# Patient Record
Sex: FEMALE | Race: WHITE | NOT HISPANIC OR LATINO | URBAN - METROPOLITAN AREA
[De-identification: names, ages, dates, MRNs, and addresses within clinical notes are randomized per-mention and may not be internally consistent; named-entity substitution may affect disease eponyms.]

---

## 2017-01-11 ENCOUNTER — HISTORICAL (OUTPATIENT)
Dept: RADIOLOGY | Facility: HOSPITAL | Age: 72
End: 2017-01-11

## 2018-01-12 ENCOUNTER — HISTORICAL (OUTPATIENT)
Dept: RADIOLOGY | Facility: HOSPITAL | Age: 73
End: 2018-01-12

## 2019-01-17 ENCOUNTER — HISTORICAL (OUTPATIENT)
Dept: RADIOLOGY | Facility: HOSPITAL | Age: 74
End: 2019-01-17

## 2020-01-20 ENCOUNTER — HISTORICAL (OUTPATIENT)
Dept: RADIOLOGY | Facility: HOSPITAL | Age: 75
End: 2020-01-20

## 2021-02-19 ENCOUNTER — HISTORICAL (OUTPATIENT)
Dept: RADIOLOGY | Facility: HOSPITAL | Age: 76
End: 2021-02-19

## 2022-02-01 NOTE — PROGRESS NOTES
Clinic Note    Reason for visit:  The primary encounter diagnosis was History of colon cancer. Diagnoses of History of colon polyps, Rectal bleeding, and BMI 34.0-34.9,adult were also pertinent to this visit.    PCP: Robert Mcgowan   [address] / [place] [address]    HPI:  This is a 76 y.o. female who is being seen for a follow-up. History of colon cancer stage I in 2005, s/p right hemicolectomy. Reports normal BMs, 2-3 BMs/day, occasional BRBPR which she attributes to hemorrhoids. Weight is stable per patient.     Last colonoscopy report reviewed from [facility]: 7/2021, 1 polyp, colon Bx normal.   Doing well overall. Takes fiber daily.    Review of Systems   Constitutional: Negative for chills, diaphoresis, fatigue, fever and unexpected weight change.   HENT: Negative for mouth sores, nosebleeds, postnasal drip, sore throat, trouble swallowing and voice change.    Eyes: Negative for pain, discharge and eye dryness.   Respiratory: Negative for apnea, cough, choking, chest tightness, shortness of breath and wheezing.    Cardiovascular: Negative for chest pain, palpitations, leg swelling and claudication.   Gastrointestinal: Negative for abdominal distention, abdominal pain, anal bleeding, blood in stool, change in bowel habit, constipation, diarrhea, nausea, rectal pain, vomiting, reflux, fecal incontinence and change in bowel habit.   Genitourinary: Negative for bladder incontinence, difficulty urinating, dysuria, flank pain, frequency and hematuria.   Musculoskeletal: Negative for arthralgias, back pain, joint swelling and joint deformity.   Integumentary:  Negative for color change, rash and wound.   Allergic/Immunologic: Negative for environmental allergies and food allergies.   Neurological: Negative for seizures, facial asymmetry, speech difficulty, weakness, headaches and memory loss.   Hematological: Negative for adenopathy. Does not bruise/bleed easily.   Psychiatric/Behavioral: Negative  "for agitation, behavioral problems, confusion, hallucinations and sleep disturbance.      Past Medical History:   Diagnosis Date    Chronic back pain     Colon cancer 2005    s/p resection    High cholesterol     Hypertension      Past Surgical History:   Procedure Laterality Date    AORTIC VALVULOPLASTY      APPENDECTOMY      CHOLECYSTOTOMY      RIGHT HEMICOLECTOMY  2005    TOTAL ABDOMINAL HYSTERECTOMY      TOTAL REPLACEMENT OF BOTH KNEES USING COMPUTER-ASSISTED NAVIGATION       History reviewed. No pertinent family history.  Social History     Tobacco Use    Smoking status: Former Smoker     Quit date:      Years since quittin.0    Smokeless tobacco: Never Used   Substance Use Topics    Alcohol use: Not Currently    Drug use: Not Currently     Review of patient's allergies indicates:   Allergen Reactions    Vicodin [hydrocodone-acetaminophen] Hives        Vital Signs:  BP (!) 178/71   Pulse 80   Ht 5' 3" (1.6 m)   Wt 87.5 kg (193 lb)   SpO2 (!) 93%   BMI 34.19 kg/m²   Body mass index is 34.19 kg/m².      Physical Exam  Vitals reviewed.   Constitutional:       General: She is awake. She is not in acute distress.     Appearance: Normal appearance. She is well-developed. She is obese. She is not ill-appearing, toxic-appearing or diaphoretic.   HENT:      Head: Normocephalic and atraumatic.      Ears:      Comments: Moderately hard of hearing     Nose: Nose normal.      Mouth/Throat:      Mouth: Mucous membranes are moist.      Pharynx: Oropharynx is clear. No oropharyngeal exudate or posterior oropharyngeal erythema.   Eyes:      General: Lids are normal. Gaze aligned appropriately. No scleral icterus.        Right eye: No discharge.         Left eye: No discharge.      Extraocular Movements: Extraocular movements intact.      Conjunctiva/sclera: Conjunctivae normal.   Neck:      Trachea: Trachea normal.   Cardiovascular:      Rate and Rhythm: Normal rate and regular rhythm.      Pulses:  "          Radial pulses are 2+ on the right side and 2+ on the left side.   Pulmonary:      Effort: Pulmonary effort is normal. No respiratory distress.      Breath sounds: Normal breath sounds. No stridor. No wheezing or rhonchi.   Chest:      Chest wall: No tenderness.   Abdominal:      General: Abdomen is flat. Bowel sounds are normal. There is no distension.      Palpations: Abdomen is soft. There is no fluid wave, hepatomegaly or mass.      Tenderness: There is no abdominal tenderness. There is no guarding or rebound.   Musculoskeletal:         General: No tenderness or deformity.      Cervical back: Full passive range of motion without pain and neck supple. No tenderness.      Right lower leg: No edema.      Left lower leg: No edema.   Lymphadenopathy:      Cervical: No cervical adenopathy.   Skin:     General: Skin is warm and dry.      Capillary Refill: Capillary refill takes less than 2 seconds.      Coloration: Skin is not cyanotic, jaundiced or pale.      Findings: No rash.   Neurological:      General: No focal deficit present.      Mental Status: She is alert and oriented to person, place, and time.      Cranial Nerves: No facial asymmetry.      Motor: No tremor.   Psychiatric:         Attention and Perception: Attention normal.         Mood and Affect: Mood and affect normal.         Speech: Speech normal.         Behavior: Behavior normal. Behavior is cooperative.          Labs: Pertinent labs reviewed.       Assessment:  History of colon cancer    History of colon polyps    Rectal bleeding    BMI 34.0-34.9,adult    She will contact us if any issues in the meantime.  Including if her blood per rectum changes or if she develops any abdominal pain.  Will plan for repeat colonoscopy in 2023 otherwise.     Recommendations:  She will contact us if any issues in the meantime.  Including if her blood per rectum changes or if she develops any abdominal pain.    Patient screened for and received weight  management and nutritional counseling regarding BMI of less than 18 or greater than 25.    Follow up in about 6 months (around 8/2/2022).    Order summary:  No orders of the defined types were placed in this encounter.         Ebonie Ellsworth MD

## 2022-02-02 ENCOUNTER — OFFICE VISIT (OUTPATIENT)
Dept: GASTROENTEROLOGY | Facility: CLINIC | Age: 77
End: 2022-02-02
Payer: MEDICARE

## 2022-02-02 VITALS
DIASTOLIC BLOOD PRESSURE: 71 MMHG | HEIGHT: 63 IN | WEIGHT: 193 LBS | SYSTOLIC BLOOD PRESSURE: 178 MMHG | BODY MASS INDEX: 34.2 KG/M2 | HEART RATE: 80 BPM | OXYGEN SATURATION: 93 %

## 2022-02-02 DIAGNOSIS — Z85.038 HISTORY OF COLON CANCER: Primary | ICD-10-CM

## 2022-02-02 DIAGNOSIS — Z86.010 HISTORY OF COLON POLYPS: ICD-10-CM

## 2022-02-02 DIAGNOSIS — K62.5 RECTAL BLEEDING: ICD-10-CM

## 2022-02-02 PROCEDURE — 99203 OFFICE O/P NEW LOW 30 MIN: CPT | Mod: S$GLB,,, | Performed by: INTERNAL MEDICINE

## 2022-02-02 PROCEDURE — 99203 PR OFFICE/OUTPT VISIT, NEW, LEVL III, 30-44 MIN: ICD-10-PCS | Mod: S$GLB,,, | Performed by: INTERNAL MEDICINE

## 2022-02-02 RX ORDER — GABAPENTIN 100 MG/1
100 CAPSULE ORAL 3 TIMES DAILY
COMMUNITY
Start: 2022-01-03

## 2022-02-02 RX ORDER — METOPROLOL TARTRATE 25 MG/1
TABLET, FILM COATED ORAL
COMMUNITY
Start: 2021-12-23

## 2022-02-02 RX ORDER — SIMVASTATIN 20 MG/1
20 TABLET, FILM COATED ORAL NIGHTLY
COMMUNITY
Start: 2021-12-11 | End: 2022-11-21 | Stop reason: DRUGHIGH

## 2022-02-02 NOTE — PATIENT INSTRUCTIONS
She will contact us if any issues in the meantime.  Including if her blood per rectum changes or if she develops any abdominal pain.

## 2022-02-07 DIAGNOSIS — Z01.419 ROUTINE GYNECOLOGICAL EXAMINATION: Primary | ICD-10-CM

## 2022-02-15 ENCOUNTER — OFFICE VISIT (OUTPATIENT)
Dept: OBSTETRICS AND GYNECOLOGY | Facility: CLINIC | Age: 77
End: 2022-02-15
Payer: MEDICARE

## 2022-02-15 VITALS
HEIGHT: 63 IN | DIASTOLIC BLOOD PRESSURE: 83 MMHG | WEIGHT: 194.13 LBS | SYSTOLIC BLOOD PRESSURE: 148 MMHG | HEART RATE: 73 BPM | BODY MASS INDEX: 34.4 KG/M2

## 2022-02-15 DIAGNOSIS — Z01.419 ROUTINE GYNECOLOGICAL EXAMINATION: Primary | ICD-10-CM

## 2022-02-15 DIAGNOSIS — N95.2 ATROPHIC VAGINITIS: ICD-10-CM

## 2022-02-15 PROCEDURE — G0101 PR CA SCREEN;PELVIC/BREAST EXAM: ICD-10-PCS | Mod: S$GLB,,, | Performed by: NURSE PRACTITIONER

## 2022-02-15 PROCEDURE — G0101 CA SCREEN;PELVIC/BREAST EXAM: HCPCS | Mod: S$GLB,,, | Performed by: NURSE PRACTITIONER

## 2022-02-15 RX ORDER — DIAZEPAM 5 MG/1
TABLET ORAL
COMMUNITY
Start: 2022-01-28

## 2022-02-15 RX ORDER — ESTRADIOL 0.1 MG/G
CREAM VAGINAL DAILY
COMMUNITY
End: 2022-08-09 | Stop reason: SDUPTHER

## 2022-02-15 NOTE — PROGRESS NOTES
"  Subjective:       Patient ID: Lisbet Carranza is a 77 y.o. female.    Chief Complaint:  Well Woman and Establish Care      History of Present Illness  Pt here for gyn annual.  History and past labs reviewed with patient.    Complaints: WWE, HX of vaginal burning and itching.      Review of Systems  Review of Systems   Constitutional: Negative for appetite change and fatigue.   HENT: Negative for tinnitus.    Cardiovascular: Negative for chest pain and palpitations.   Gastrointestinal: Negative for abdominal pain, bloating, constipation, vomiting and reflux.   Endocrine: Negative for diabetes, hyperthyroidism and hypothyroidism.   Genitourinary: Positive for vaginal dryness. Negative for flank pain, frequency, pelvic pain, urgency, vaginal bleeding and urinary incontinence.   Musculoskeletal: Negative for back pain and myalgias.   Integumentary:  Negative for rash, mole/lesion, breast mass and breast tenderness.   Neurological: Negative for vertigo, syncope and headaches.   Psychiatric/Behavioral: Negative for depression and sleep disturbance. The patient is not nervous/anxious.    Breast: Negative for lump, mass, mastodynia and tenderness          Objective:     Vitals:    02/15/22 1105   BP: (!) 148/83   Pulse: 73   Weight: 88.1 kg (194 lb 2 oz)   Height: 5' 3" (1.6 m)        Physical Exam:   Constitutional: She is oriented to person, place, and time. She appears well-developed and well-nourished. No distress.    HENT:   Head: Normocephalic and atraumatic.    Eyes: Conjunctivae and EOM are normal.    Neck: No tracheal deviation present. No thyromegaly present.    Cardiovascular: Normal rate and regular rhythm.  Exam reveals no clubbing, no cyanosis and no edema.     Pulmonary/Chest: Effort normal. No respiratory distress.        Abdominal: Soft. She exhibits no distension and no mass. There is no abdominal tenderness. There is no rebound and no guarding. No hernia.     Genitourinary:    Vagina and rectum normal. "      Pelvic exam was performed with patient supine.   There is no rash, tenderness, lesion or injury on the right labia. There is no rash, tenderness, lesion or injury on the left labia. Right adnexum displays no mass, no tenderness and no fullness. Left adnexum displays no mass, no tenderness and no fullness. Vaginal cuff normal.  No  no vaginal discharge in the vagina. Cervix is absent.Uterus is absent.    Genitourinary Comments: Atrophic vaginal changes. The area that she was concerned about is old scar tissue and nothing to be concerned about.              Musculoskeletal: Normal range of motion.       Neurological: She is alert and oriented to person, place, and time.    Skin: Skin is warm and dry. She is not diaphoretic. No cyanosis. Nails show no clubbing.    Psychiatric: She has a normal mood and affect. Her behavior is normal. Judgment and thought content normal.      breast exam wnl- no nipple dc, skin changes, masses or lymph nodes palpated bilaterally    Assessment:     1. Routine gynecological examination    2. Atrophic vaginitis              Plan:       Routine gynecological examination  -     Ambulatory referral/consult to Obstetrics / Gynecology    Atrophic vaginitis     wet prep was done and neg for yeast, BV or Tric.   Diet and exercise  Gentle soap in genital area  MMG and bone density is patsy with PCP   Follow up in about 1 year (around 2/15/2023).

## 2022-02-21 ENCOUNTER — HISTORICAL (OUTPATIENT)
Dept: ADMINISTRATIVE | Facility: HOSPITAL | Age: 77
End: 2022-02-21

## 2022-02-21 ENCOUNTER — HISTORICAL (OUTPATIENT)
Dept: RADIOLOGY | Facility: HOSPITAL | Age: 77
End: 2022-02-21

## 2022-03-08 ENCOUNTER — TELEPHONE (OUTPATIENT)
Dept: GASTROENTEROLOGY | Facility: CLINIC | Age: 77
End: 2022-03-08
Payer: MEDICARE

## 2022-03-08 NOTE — TELEPHONE ENCOUNTER
----- Message from Lizbeth Goel sent at 3/8/2022  9:17 AM CST -----  .Type:  Sooner Appointment Request    Caller is requesting a sooner appointment.  Caller declined first available appointment listed below.  Caller will not accept being placed on the waitlist and is requesting a message be sent to doctor.  Name of Caller: self  When is the first available appointment? 7/7  Symptoms: hemorrhoids  Would the patient rather a call back or a response via [facility]? call  Best Call Back Number:.[phone] (home)   Additional Information:

## 2022-03-08 NOTE — TELEPHONE ENCOUNTER
Please let the patient know that Dr. Ellsworth does not have any work in appointments available. Okay to offer next available & wait list.  KDL, CMA

## 2022-04-12 ENCOUNTER — OFFICE VISIT (OUTPATIENT)
Dept: OBSTETRICS AND GYNECOLOGY | Facility: CLINIC | Age: 77
End: 2022-04-12
Payer: MEDICARE

## 2022-04-12 VITALS
DIASTOLIC BLOOD PRESSURE: 73 MMHG | WEIGHT: 199.38 LBS | OXYGEN SATURATION: 96 % | BODY MASS INDEX: 35.33 KG/M2 | HEART RATE: 84 BPM | SYSTOLIC BLOOD PRESSURE: 175 MMHG | HEIGHT: 63 IN

## 2022-04-12 DIAGNOSIS — N95.2 ATROPHIC VAGINITIS: Primary | ICD-10-CM

## 2022-04-12 DIAGNOSIS — B37.31 YEAST VAGINITIS: ICD-10-CM

## 2022-04-12 DIAGNOSIS — K64.1 GRADE II HEMORRHOIDS: ICD-10-CM

## 2022-04-12 DIAGNOSIS — M85.852 OSTEOPENIA OF LEFT HIP: ICD-10-CM

## 2022-04-12 PROCEDURE — 99203 PR OFFICE/OUTPT VISIT, NEW, LEVL III, 30-44 MIN: ICD-10-PCS | Mod: S$GLB,,, | Performed by: NURSE PRACTITIONER

## 2022-04-12 PROCEDURE — 99203 OFFICE O/P NEW LOW 30 MIN: CPT | Mod: S$GLB,,, | Performed by: NURSE PRACTITIONER

## 2022-04-12 RX ORDER — NYSTATIN AND TRIAMCINOLONE ACETONIDE 100000; 1 [USP'U]/G; MG/G
OINTMENT TOPICAL 2 TIMES DAILY
Qty: 15 G | Refills: 2 | Status: SHIPPED | OUTPATIENT
Start: 2022-04-12 | End: 2022-08-12 | Stop reason: SDUPTHER

## 2022-04-12 RX ORDER — HYDROCORTISONE ACETATE 25 MG/1
25 SUPPOSITORY RECTAL 2 TIMES DAILY
Qty: 12 SUPPOSITORY | Refills: 1 | Status: SHIPPED | OUTPATIENT
Start: 2022-04-12 | End: 2023-03-21

## 2022-04-12 RX ORDER — CLOBETASOL PROPIONATE 0.5 MG/G
CREAM TOPICAL
COMMUNITY
Start: 2022-02-24

## 2022-04-12 NOTE — PROGRESS NOTES
"  Subjective:       Patient ID: Lisbet Carranza is a 77 y.o. female.    Chief Complaint:  Follow-up (Patient is here to talk about some test she had done )      History of Present Illness  Pt here for complaints of osteoporosis with failed use of the  Alendronate due to aching bones and joints with constipation. Inflamed hemorrhoids, vaginal atrophy with itching rash.  History and past labs reviewed with patient.        Review of Systems  Review of Systems   Gastrointestinal: Positive for fecal incontinence.   Genitourinary: Positive for vaginal pain and vaginal dryness.        Bone pain with the current alendronate   Psychiatric/Behavioral: Negative for sleep disturbance. The patient is not nervous/anxious.            Objective:     Vitals:    04/12/22 1355   BP: (!) 175/73   BP Location: Left arm   Patient Position: Sitting   Pulse: 84   SpO2: 96%   Weight: 90.4 kg (199 lb 6.4 oz)   Height: 5' 3" (1.6 m)        Physical Exam:               Genitourinary:    Genitourinary Comments: Atrophic vaginal changes with yeast rash.   Bleeding hemorrhoids  Did receive limited records from Dr. Sawyer   But will need the scans to send to the bone center                   Psychiatric: She has a normal mood and affect. Her behavior is normal. Thought content normal.          Assessment:     1. Atrophic vaginitis    2. Yeast vaginitis    3. Grade II hemorrhoids    4. Osteopenia of left hip              Plan:       Atrophic vaginitis    Yeast vaginitis  -     nystatin-triamcinolone (MYCOLOG) ointment; Apply topically 2 (two) times daily.  Dispense: 15 g; Refill: 2    Grade II hemorrhoids  -     hydrocortisone (ANUSOL-HC) 25 mg suppository; Place 1 suppository (25 mg total) rectally 2 (two) times daily.  Dispense: 12 suppository; Refill: 1    Osteopenia of left hip     Once received the records from [facility], the MD stated osteoporosis, but it is osteopenia and will not need the referral to bone center  Call to pt to increase " outside wt bearing exercise and calcium as she can tolerate. DC fosamax      Follow up if symptoms worsen or fail to improve.

## 2022-04-12 NOTE — Clinical Note
Please locate the actual record of the bone density that was done on this pt, also will need a referral to the bone center for the IV infusion for osteoporosis.  We will need the actual location of the bone density and not just his note.  Thank you in advance for your help.

## 2022-04-13 PROBLEM — M85.852 OSTEOPENIA OF LEFT HIP: Status: ACTIVE | Noted: 2022-04-13

## 2022-07-12 NOTE — PROGRESS NOTES
Clinic Note    Reason for visit:  The primary encounter diagnosis was History of colon cancer. Diagnoses of History of colon polyps, Rectal bleeding, and Obesity, Class II, BMI 35-39.9, isolated (see actual BMI) were also pertinent to this visit.    PCP: Robert Mcgowan       HPI:  This is a 77 y.o. female who is being seen for a follow-up. History of colon cancer stage I in 2005, s/p right hemicolectomy. Was having BRBPR for months and saw PCP who referred her to Dr. Merritt for colonoscopy. Had colonoscopy 5/9/2022 with Dr. Merritt who reported multiple large diverticula in descending/sigmoid colon and multiple medium-sized IH. Since the colonoscopy, she is not bleeding as much and knows it is from hemorrhoids. Reports BMs are normal for her.         Review of Systems   Constitutional: Negative for chills, diaphoresis, fatigue, fever and unexpected weight change.   HENT: Negative for mouth sores, nosebleeds, postnasal drip, sore throat, trouble swallowing and voice change.    Eyes: Positive for eye dryness. Negative for pain and discharge.   Respiratory: Negative for apnea, cough, choking, chest tightness, shortness of breath and wheezing.    Cardiovascular: Negative for chest pain, palpitations, leg swelling and claudication.   Gastrointestinal: Positive for anal bleeding. Negative for abdominal distention, abdominal pain, blood in stool, change in bowel habit, constipation, diarrhea, nausea, rectal pain, vomiting, reflux, fecal incontinence and change in bowel habit.   Genitourinary: Negative for bladder incontinence, difficulty urinating, dysuria, flank pain, frequency and hematuria.   Musculoskeletal: Negative for arthralgias, back pain, joint swelling and joint deformity.   Integumentary:  Negative for color change, rash and wound.   Allergic/Immunologic: Positive for environmental allergies. Negative for food allergies.   Neurological: Negative for seizures, facial asymmetry, speech difficulty, weakness,  "headaches and memory loss.   Hematological: Negative for adenopathy. Bruises/bleeds easily.   Psychiatric/Behavioral: Negative for agitation, behavioral problems, confusion, hallucinations and sleep disturbance.      Past Medical History:   Diagnosis Date    Chronic back pain     Colon cancer     s/p resection    Heart valve problem 2016    High cholesterol     Hypertension      Past Surgical History:   Procedure Laterality Date    AORTIC VALVULOPLASTY      APPENDECTOMY      CHOLECYSTOTOMY      RIGHT HEMICOLECTOMY  2005    TOTAL ABDOMINAL HYSTERECTOMY      TOTAL REPLACEMENT OF BOTH KNEES USING COMPUTER-ASSISTED NAVIGATION       Family History   Problem Relation Age of Onset    Lung cancer Father      Social History     Tobacco Use    Smoking status: Former Smoker     Types: Cigarettes     Quit date:      Years since quittin.5    Smokeless tobacco: Never Used   Substance Use Topics    Alcohol use: Not Currently    Drug use: Not Currently     Review of patient's allergies indicates:   Allergen Reactions    Vicodin [hydrocodone-acetaminophen] Hives        Medication List with Changes/Refills   Current Medications    CLOBETASOL (TEMOVATE) 0.05 % CREAM    APPLY ON THE SKIN TWICE A DAY    DIAZEPAM (VALIUM) 5 MG TABLET    TAKE 1 TABLET BY MOUTH ONCE DAILY AT NIGHT AS NEEDED    ESTRADIOL (ESTRACE) 0.01 % (0.1 MG/GRAM) VAGINAL CREAM    Place vaginally once daily.    GABAPENTIN (NEURONTIN) 100 MG CAPSULE    Take 100 mg by mouth 3 (three) times daily.    HYDROCORTISONE (ANUSOL-HC) 25 MG SUPPOSITORY    Place 1 suppository (25 mg total) rectally 2 (two) times daily.    METOPROLOL TARTRATE (LOPRESSOR) 25 MG TABLET    TAKE 1/2 (ONE-HALF) TABLET BY MOUTH TWICE DAILY    NYSTATIN-TRIAMCINOLONE (MYCOLOG) OINTMENT    Apply topically 2 (two) times daily.    SIMVASTATIN (ZOCOR) 20 MG TABLET    Take 20 mg by mouth nightly.         Vital Signs:  BP (!) 143/61   Pulse 64   Ht 5' 2" (1.575 m)   Wt 92.5 " kg (204 lb)   SpO2 (!) 94%   BMI 37.31 kg/m²         Physical Exam  Vitals reviewed.   Constitutional:       General: She is awake. She is not in acute distress.     Appearance: Normal appearance. She is well-developed. She is obese. She is not ill-appearing, toxic-appearing or diaphoretic.   HENT:      Head: Normocephalic and atraumatic.      Nose: Nose normal.      Mouth/Throat:      Mouth: Mucous membranes are moist.      Pharynx: Oropharynx is clear. No oropharyngeal exudate or posterior oropharyngeal erythema.   Eyes:      General: Lids are normal. Gaze aligned appropriately. No scleral icterus.        Right eye: No discharge.         Left eye: No discharge.      Extraocular Movements: Extraocular movements intact.      Conjunctiva/sclera: Conjunctivae normal.   Neck:      Trachea: Trachea normal.   Cardiovascular:      Rate and Rhythm: Normal rate and regular rhythm.      Pulses:           Radial pulses are 2+ on the right side and 2+ on the left side.   Pulmonary:      Effort: Pulmonary effort is normal. No respiratory distress.      Breath sounds: Normal breath sounds. No stridor. No wheezing or rhonchi.   Chest:      Chest wall: No tenderness.   Abdominal:      General: Bowel sounds are normal. There is no distension.      Palpations: Abdomen is soft. There is no fluid wave, hepatomegaly or mass.      Tenderness: There is no abdominal tenderness. There is no guarding or rebound.   Musculoskeletal:         General: No tenderness or deformity.      Cervical back: Full passive range of motion without pain and neck supple. No tenderness.      Right lower leg: No edema.      Left lower leg: No edema.      Comments: +kyphosis    Lymphadenopathy:      Cervical: No cervical adenopathy.   Skin:     General: Skin is warm and dry.      Capillary Refill: Capillary refill takes less than 2 seconds.      Coloration: Skin is not cyanotic, jaundiced or pale.      Findings: No rash.   Neurological:      General: No focal  deficit present.      Mental Status: She is alert and oriented to person, place, and time.      Cranial Nerves: No facial asymmetry.      Motor: No tremor.   Psychiatric:         Attention and Perception: Attention normal.         Mood and Affect: Mood and affect normal.         Speech: Speech normal.         Behavior: Behavior normal. Behavior is cooperative.            All of the data above and below has been reviewed by myself and any further interpretations will be reflected in the assessment and plan.   The data includes review of external notes, and independent interpretation of lab results, procedures, x-rays, and imaging reports.      Assessment:  History of colon cancer    History of colon polyps    Rectal bleeding    Obesity, Class II, BMI 35-39.9, isolated (see actual BMI)    Overall doing well. Will plan for repeat colonoscopy in 2023 unless having any issues.     Recommendations:  She will contact us if any issues in the meantime.    Risks, benefits, and alternatives of medical management, any associated procedures, and/or treatment discussed with the patient. Patient given opportunity to ask questions and voices understanding. Patient has elected to proceed with the recommended care modalities as discussed.    Follow up in about 1 year (around 7/13/2023).    Order summary:  No orders of the defined types were placed in this encounter.       Instructed patient to notify my office if they have not been contacted within two weeks after any procedures, submitting any samples (biopsies, blood, stool, urine, etc.) or after any imaging (X-ray, CT, MRI, etc.).     Ebonie Ellsworth MD    This document may have been created using a voice recognition transcribing system. Incorrect words or phrases may have been missed during proofreading. Please interpret accordingly or contact me for clarification.

## 2022-07-13 ENCOUNTER — OFFICE VISIT (OUTPATIENT)
Dept: GASTROENTEROLOGY | Facility: CLINIC | Age: 77
End: 2022-07-13
Payer: MEDICARE

## 2022-07-13 VITALS
DIASTOLIC BLOOD PRESSURE: 61 MMHG | HEART RATE: 64 BPM | HEIGHT: 62 IN | BODY MASS INDEX: 37.54 KG/M2 | OXYGEN SATURATION: 94 % | SYSTOLIC BLOOD PRESSURE: 143 MMHG | WEIGHT: 204 LBS

## 2022-07-13 DIAGNOSIS — K62.5 RECTAL BLEEDING: ICD-10-CM

## 2022-07-13 DIAGNOSIS — Z85.038 HISTORY OF COLON CANCER: Primary | ICD-10-CM

## 2022-07-13 DIAGNOSIS — E66.9 OBESITY, CLASS II, BMI 35-39.9, ISOLATED (SEE ACTUAL BMI): ICD-10-CM

## 2022-07-13 DIAGNOSIS — Z86.010 HISTORY OF COLON POLYPS: ICD-10-CM

## 2022-07-13 PROCEDURE — 99213 OFFICE O/P EST LOW 20 MIN: CPT | Mod: S$GLB,,, | Performed by: INTERNAL MEDICINE

## 2022-07-13 PROCEDURE — 99213 PR OFFICE/OUTPT VISIT, EST, LEVL III, 20-29 MIN: ICD-10-PCS | Mod: S$GLB,,, | Performed by: INTERNAL MEDICINE

## 2022-07-13 NOTE — PATIENT INSTRUCTIONS
Contact us if any issues in the meantime.    Please notify my office if you have not been contacted within two weeks after any procedures, submitting any samples (biopsies, blood, stool, urine, etc.) or after any imaging (X-ray, CT, MRI, etc.).

## 2022-07-13 NOTE — LETTER
July 13, 2022        Robert Mcgowan MD  [address]  [address]  [place] [address]             [facility]  [address] [address]  Phone: [phone]  Fax: [phone]   Patient: Lisbet Carranza   MR Number: [id number]   YOB: 1945   Date of Visit: 7/13/2022       Dear Dr. Mcgowan:    Thank you for referring Lisbet Carranza to me for evaluation. Attached you will find relevant portions of my assessment and plan of care.    If you have questions, please do not hesitate to call me. I look forward to following Lisbet Carranza along with you.    Sincerely,      Ebonie Ellsworth MD            CC  No Recipients    Enclosure

## 2022-08-09 ENCOUNTER — OFFICE VISIT (OUTPATIENT)
Dept: OBSTETRICS AND GYNECOLOGY | Facility: CLINIC | Age: 77
End: 2022-08-09
Payer: MEDICARE

## 2022-08-09 VITALS
BODY MASS INDEX: 37.46 KG/M2 | HEART RATE: 83 BPM | SYSTOLIC BLOOD PRESSURE: 137 MMHG | DIASTOLIC BLOOD PRESSURE: 63 MMHG | HEIGHT: 62 IN | WEIGHT: 203.56 LBS

## 2022-08-09 DIAGNOSIS — N95.2 ATROPHIC VAGINITIS: ICD-10-CM

## 2022-08-09 DIAGNOSIS — L30.9 DERMATITIS: Primary | ICD-10-CM

## 2022-08-09 DIAGNOSIS — B37.31 YEAST VAGINITIS: ICD-10-CM

## 2022-08-09 PROCEDURE — 99213 PR OFFICE/OUTPT VISIT, EST, LEVL III, 20-29 MIN: ICD-10-PCS | Mod: S$GLB,,, | Performed by: NURSE PRACTITIONER

## 2022-08-09 PROCEDURE — 99213 OFFICE O/P EST LOW 20 MIN: CPT | Mod: S$GLB,,, | Performed by: NURSE PRACTITIONER

## 2022-08-09 RX ORDER — ESTRADIOL 0.1 MG/G
1 CREAM VAGINAL
Qty: 42.5 G | Refills: 2 | Status: SHIPPED | OUTPATIENT
Start: 2022-08-11 | End: 2023-03-21 | Stop reason: SDUPTHER

## 2022-08-09 RX ORDER — NYSTATIN 100000 [USP'U]/G
POWDER TOPICAL
Qty: 60 G | Refills: 2 | Status: SHIPPED | OUTPATIENT
Start: 2022-08-09 | End: 2022-08-12 | Stop reason: SDUPTHER

## 2022-08-09 NOTE — PROGRESS NOTES
"CC: Vaginal discharge    Lisbet Carranza is a 77 y.o. female  presents with complaint of vaginal itching and excoriation on the labia and the inner thigh.     Past Medical History:   Diagnosis Date    Chronic back pain     Colon cancer     s/p resection    Heart valve problem 2016    High cholesterol     Hypertension      Past Surgical History:   Procedure Laterality Date    AORTIC VALVULOPLASTY      APPENDECTOMY      CHOLECYSTOTOMY      RIGHT HEMICOLECTOMY  2005    TOTAL ABDOMINAL HYSTERECTOMY      TOTAL REPLACEMENT OF BOTH KNEES USING COMPUTER-ASSISTED NAVIGATION       Social History     Tobacco Use    Smoking status: Former Smoker     Types: Cigarettes     Quit date:      Years since quittin.6    Smokeless tobacco: Never Used   Substance Use Topics    Alcohol use: Not Currently    Drug use: Not Currently     Family History   Problem Relation Age of Onset    Lung cancer Father      OB History    Para Term  AB Living   3 3 3     3   SAB IAB Ectopic Multiple Live Births           3      # Outcome Date GA Lbr [place]/2nd Weight Sex Delivery Anes PTL Lv   3 Term     F Vag-Spont  N FEDE   2 Term     M Vag-Spont  N FEDE   1 Term     M Vag-Spont  N FEDE         ROS:  GENERAL: No fever, chills, fatigability or weight loss.  VULVAR: Having  Itching, noted to have contact dermatitis on the labia external and the inner thigh.      VAGINAL: No relaxation, no itching, no discharge, no abnormal bleeding and no lesions.  ABDOMEN: No abdominal pain. Denies nausea. Denies vomiting. No diarrhea. No constipation  BREAST: Denies pain. No lumps. No discharge.  URINARY: No incontinence, no nocturia, no frequency and no dysuria.  CARDIOVASCULAR: No chest pain. No shortness of breath. No leg cramps.  NEUROLOGICAL: No headaches. No vision changes.      /63   Pulse 83   Ht 5' 2" (1.575 m)   Wt 92.3 kg (203 lb 9 oz)   BMI 37.23 kg/m²     PHYSICAL EXAM:  Noted to have red and " itching skin on the external genitalia,Normal  vulva, vagina, cervix, uterus and adnexa    ASSESSMENT and PLAN:  Dermatitis  -     nystatin (MYCOSTATIN) powder; Apply to affected area 3 times daily  Dispense: 60 g; Refill: 2    Atrophic vaginitis  -     estradioL (ESTRACE) 0.01 % (0.1 mg/gram) vaginal cream; Place 1 g vaginally twice a week.  Dispense: 42.5 g; Refill: 2           Patient was counseled today on vaginitis prevention including :  a. avoiding feminine products such as deoderant soaps, body wash, bubble bath, douches, scented toilet paper, deoderant tampons or pads, feminine wipes, chronic pad use, etc.  b. avoiding other vulvovaginal irritants such as long hot baths, humidity, tight, synthetic clothing, chlorine and sitting around in wet bathing suits  c. wearing cotton underwear, avoiding thong underwear and no underwear to bed  d. taking showers instead of baths and use a hair dryer on cool setting afterwards to dry  e. wearing cotton to exercise and shower immediately after exercise and change clothes  f. using polyurethane condoms without spermicide if sexually active and symptoms are triggered by intercourse    FOLLOW UP: PRN lack of improvement.

## 2022-08-12 ENCOUNTER — TELEPHONE (OUTPATIENT)
Dept: OBSTETRICS AND GYNECOLOGY | Facility: CLINIC | Age: 77
End: 2022-08-12
Payer: MEDICARE

## 2022-08-12 RX ORDER — NYSTATIN 100000 [USP'U]/G
POWDER TOPICAL
Qty: 60 G | Refills: 2 | Status: SHIPPED | OUTPATIENT
Start: 2022-08-12 | End: 2023-03-21

## 2022-08-12 RX ORDER — FLUCONAZOLE 100 MG/1
100 TABLET ORAL DAILY
Qty: 3 TABLET | Refills: 0 | Status: SHIPPED | OUTPATIENT
Start: 2022-08-12 | End: 2022-08-15

## 2022-08-12 RX ORDER — NYSTATIN 100000 [USP'U]/G
POWDER TOPICAL
Qty: 60 G | Refills: 2 | Status: SHIPPED | OUTPATIENT
Start: 2022-08-12 | End: 2023-07-18

## 2022-08-12 RX ORDER — NYSTATIN AND TRIAMCINOLONE ACETONIDE 100000; 1 [USP'U]/G; MG/G
OINTMENT TOPICAL 2 TIMES DAILY
Qty: 15 G | Refills: 2 | Status: SHIPPED | OUTPATIENT
Start: 2022-08-12 | End: 2023-07-18

## 2022-08-12 NOTE — TELEPHONE ENCOUNTER
Pt is complaining about her buttocks, so Mrs Renteria is going to send her some Med at her pharmacy. Pt is aware and voices understanding. Yolande

## 2022-11-21 ENCOUNTER — OFFICE VISIT (OUTPATIENT)
Dept: OBSTETRICS AND GYNECOLOGY | Facility: CLINIC | Age: 77
End: 2022-11-21
Payer: MEDICARE

## 2022-11-21 VITALS
WEIGHT: 204.38 LBS | DIASTOLIC BLOOD PRESSURE: 59 MMHG | HEART RATE: 64 BPM | BODY MASS INDEX: 37.38 KG/M2 | SYSTOLIC BLOOD PRESSURE: 158 MMHG

## 2022-11-21 DIAGNOSIS — N95.2 ATROPHIC VAGINITIS: Primary | ICD-10-CM

## 2022-11-21 PROCEDURE — 99213 PR OFFICE/OUTPT VISIT, EST, LEVL III, 20-29 MIN: ICD-10-PCS | Mod: S$GLB,,, | Performed by: NURSE PRACTITIONER

## 2022-11-21 PROCEDURE — 99213 OFFICE O/P EST LOW 20 MIN: CPT | Mod: S$GLB,,, | Performed by: NURSE PRACTITIONER

## 2022-11-21 RX ORDER — METOPROLOL TARTRATE 50 MG/1
TABLET ORAL
COMMUNITY
Start: 2022-11-18

## 2022-11-21 RX ORDER — SIMVASTATIN 40 MG/1
40 TABLET, FILM COATED ORAL NIGHTLY
COMMUNITY
Start: 2022-11-09

## 2022-11-21 RX ORDER — ASPIRIN 81 MG/1
81 TABLET ORAL DAILY
COMMUNITY

## 2022-11-21 NOTE — PROGRESS NOTES
Subjective:       Patient ID: Lisbet Carranza is a 77 y.o. female.    Chief Complaint:  Other (Vag spot on left side)      History of Present Illness   Presents for skin change on the left side of vagina.  Currently using estrogen cream 2x/week  Not sexually active  Denies any pain/bleeding  No LMP recorded. Patient is postmenopausal.        OB History          3    Para   3    Term   3            AB        Living   3         SAB        IAB        Ectopic        Multiple        Live Births   3                  Review of Systems  Review of Systems   Constitutional: Negative.    Cardiovascular: Negative.    Gastrointestinal: Negative.    Genitourinary:  Negative for pelvic pain, vaginal bleeding, vaginal discharge, vaginal pain, postmenopausal bleeding, vaginal dryness and vaginal odor.   Musculoskeletal: Negative.    Psychiatric/Behavioral: Negative.     Breast: negative.          Objective:     Vitals:    22 1058   BP: (!) 158/59   Pulse: 64   Weight: 92.7 kg (204 lb 6 oz)        Physical Exam:   Constitutional: She is oriented to person, place, and time. She appears well-developed and well-nourished.    HENT:   Head: Atraumatic.      Cardiovascular:  Normal rate.             Pulmonary/Chest: Effort normal.        Abdominal: Soft. There is no abdominal tenderness.     Genitourinary: The external female genitalia was normal.   Genitalia hair distrobution normal .   Vaginal atrophy noted.    Genitourinary Comments: No lesions noted              Musculoskeletal: Moves all extremeties.       Neurological: She is alert and oriented to person, place, and time.    Skin: Skin is warm and dry.    Psychiatric: She has a normal mood and affect. Her behavior is normal. Judgment and thought content normal.        Assessment:     1. Atrophic vaginitis              Plan:       Atrophic vaginitis     Reassurance given that the color changes from the estrogen use.  Follow up if symptoms worsen or fail to  improve.

## 2023-03-02 DIAGNOSIS — N28.89 KIDNEY MASS: Primary | ICD-10-CM

## 2023-03-21 ENCOUNTER — OFFICE VISIT (OUTPATIENT)
Dept: OBSTETRICS AND GYNECOLOGY | Facility: CLINIC | Age: 78
End: 2023-03-21
Payer: MEDICARE

## 2023-03-21 VITALS
HEIGHT: 62 IN | SYSTOLIC BLOOD PRESSURE: 166 MMHG | DIASTOLIC BLOOD PRESSURE: 81 MMHG | HEART RATE: 72 BPM | BODY MASS INDEX: 36.48 KG/M2 | WEIGHT: 198.25 LBS

## 2023-03-21 DIAGNOSIS — Z01.419 WELL WOMAN EXAM: ICD-10-CM

## 2023-03-21 DIAGNOSIS — R22.31 AXILLARY MASS, RIGHT: ICD-10-CM

## 2023-03-21 DIAGNOSIS — Z01.419 ROUTINE GYNECOLOGICAL EXAMINATION: ICD-10-CM

## 2023-03-21 DIAGNOSIS — N95.2 ATROPHIC VAGINITIS: ICD-10-CM

## 2023-03-21 DIAGNOSIS — M85.852 OSTEOPENIA OF LEFT HIP: ICD-10-CM

## 2023-03-21 DIAGNOSIS — Z12.31 SCREENING MAMMOGRAM, ENCOUNTER FOR: Primary | ICD-10-CM

## 2023-03-21 PROCEDURE — G0101 PR CA SCREEN;PELVIC/BREAST EXAM: ICD-10-PCS | Mod: GZ,S$GLB,, | Performed by: NURSE PRACTITIONER

## 2023-03-21 PROCEDURE — G0101 CA SCREEN;PELVIC/BREAST EXAM: HCPCS | Mod: GZ,S$GLB,, | Performed by: NURSE PRACTITIONER

## 2023-03-21 RX ORDER — AMLODIPINE BESYLATE 5 MG/1
5 TABLET ORAL
COMMUNITY
Start: 2023-03-02

## 2023-03-21 RX ORDER — ESTRADIOL 0.1 MG/G
1 CREAM VAGINAL
Qty: 42.5 G | Refills: 2 | Status: SHIPPED | OUTPATIENT
Start: 2023-03-23 | End: 2024-03-26 | Stop reason: SDUPTHER

## 2023-03-21 NOTE — PROGRESS NOTES
"    Subjective:       Patient ID: Lisbet Carranza is a 78 y.o. female.    Chief Complaint:  Well Woman      History of Present Illness   Presents for annual gyn exam. History and past labs reviewed with patient.    Complains of mass under right arm that has been there for years, but pat is concerned.  Not sexually active   No LMP recorded. Patient is postmenopausal.  No hx of abnormal paps/stds    OB History          3    Para   3    Term   3            AB        Living   3         SAB        IAB        Ectopic        Multiple        Live Births   3                  Review of Systems  Review of Systems   Constitutional:  Negative for chills and fever.   Respiratory:  Negative for shortness of breath.    Cardiovascular:  Negative for chest pain.   Gastrointestinal:  Negative for abdominal pain, blood in stool, constipation, diarrhea, nausea, vomiting and reflux.   Genitourinary:  Negative for dysmenorrhea, dyspareunia, dysuria, hematuria, hot flashes, menorrhagia, menstrual problem, pelvic pain, vaginal bleeding, vaginal discharge, postcoital bleeding and vaginal dryness.   Musculoskeletal:  Negative for arthralgias and joint swelling.   Integumentary:  Negative for rash, hair changes, breast mass, nipple discharge and breast skin changes.   Psychiatric/Behavioral:  Negative for depression. The patient is not nervous/anxious.    Breast: Negative for asymmetry, lump, mass, nipple discharge and skin changes        Objective:     Vitals:    23 1254   BP: (!) 166/81   Pulse: 72   Weight: 89.9 kg (198 lb 4 oz)   Height: 5' 2" (1.575 m)        Physical Exam:   Constitutional: She is oriented to person, place, and time. She appears well-developed and well-nourished.    HENT:   Head: Normocephalic and atraumatic.    Eyes: Pupils are equal, round, and reactive to light. Conjunctivae are normal.    Neck: No thyromegaly present.    Cardiovascular:  Normal rate.             Pulmonary/Chest: Effort normal and " breath sounds normal.        Abdominal: Soft.     Genitourinary:    Inguinal canal, vagina, uterus, right adnexa, left adnexa and rectum normal.      Pelvic exam was performed with patient supine.   The external female genitalia was normal.   Genitalia hair distrobution normal .   Labial bartholins normal.Cervix is normal. No  no vaginal discharge in the vagina. Uterus consistancy normal. and Uerus contour normal  Uterus is not tender.           Musculoskeletal: Normal range of motion and moves all extremeties.       Neurological: She is alert and oriented to person, place, and time. She has normal reflexes.    Skin: Skin is warm and dry.    Psychiatric: She has a normal mood and affect. Her behavior is normal. Judgment and thought content normal.     breast exam wnl- no nipple dc, skin changes, masses or lymph nodes palpated bilaterally    Assessment:     1. Screening mammogram, encounter for    2. Routine gynecological examination    3. Well woman exam    4. Atrophic vaginitis    5. Axillary mass, right    6. Osteopenia of left hip              Plan:       Screening mammogram, encounter for  -     Mammo Digital Screening Bilat; Future; Expected date: 03/21/2023    Routine gynecological examination    Well woman exam  -     Mammo Digital Screening Bilat; Future; Expected date: 03/21/2023    Atrophic vaginitis  -     estradioL (ESTRACE) 0.01 % (0.1 mg/gram) vaginal cream; Place 1 g vaginally twice a week.  Dispense: 42.5 g; Refill: 2    Axillary mass, right  -     US Breast Right Complete; Future; Expected date: 03/21/2023    Osteopenia of left hip         Colonoscopy UTD  BMD UTD  Vaccine UTD   Continue with calcium and vit D repeat dexa next year   Risk assessment for inherited gyn cancer done   Women's daily Multi vitamin  Healthy diet, exercise, and life style       Follow up in about 1 year (around 3/21/2024).

## 2023-03-30 ENCOUNTER — OFFICE VISIT (OUTPATIENT)
Dept: UROLOGY | Facility: CLINIC | Age: 78
End: 2023-03-30
Payer: MEDICARE

## 2023-03-30 DIAGNOSIS — N28.9 RENAL LESION: ICD-10-CM

## 2023-03-30 PROCEDURE — 99213 PR OFFICE/OUTPT VISIT, EST, LEVL III, 20-29 MIN: ICD-10-PCS | Mod: S$GLB,,, | Performed by: NURSE PRACTITIONER

## 2023-03-30 PROCEDURE — 99213 OFFICE O/P EST LOW 20 MIN: CPT | Mod: S$GLB,,, | Performed by: NURSE PRACTITIONER

## 2023-03-30 NOTE — PROGRESS NOTES
Subjective:       Patient ID: Lisbet Carranza is a 78 y.o. female.    Chief Complaint: No chief complaint on file.      HPI: 78-year-old female, new to [facility], referred by Dr. Mcgowan.    Patient referred for a renal lesion.    Patient had a CT on 02/27/2023 showing a small 1.3 cm lesion in the left kidney measures 34 Hounsfield units precontrast in 50 Hounsfield units postcontrast which suggest mild enhancement.  This is suspicious for renal neoplasm however due to small size is also possible change in density secondary to volume averaging.      Patient denies any abdominal pain or flank pain.  Denies seeing any blood in urine.  Denies any odor urine.  Denies any fever or body aches.  Denies any significant weight loss.    No other urinary complaints at this time.       Past Medical History:   Past Medical History:   Diagnosis Date    Chronic back pain     Colon cancer 2005    s/p resection    Heart block     Heart valve problem 08/09/2016    High cholesterol     Hypertension        Past Surgical Historical:   Past Surgical History:   Procedure Laterality Date    AORTIC VALVULOPLASTY      APPENDECTOMY      CHOLECYSTOTOMY      RIGHT HEMICOLECTOMY  2005    TOTAL ABDOMINAL HYSTERECTOMY      TOTAL REPLACEMENT OF BOTH KNEES USING COMPUTER-ASSISTED NAVIGATION          Medications:   Medication List with Changes/Refills   Current Medications    AMLODIPINE (NORVASC) 5 MG TABLET    Take 5 mg by mouth.    ASPIRIN (ECOTRIN) 81 MG EC TABLET    Take 81 mg by mouth once daily.    CLOBETASOL (TEMOVATE) 0.05 % CREAM    APPLY ON THE SKIN TWICE A DAY    DIAZEPAM (VALIUM) 5 MG TABLET    TAKE 1 TABLET BY MOUTH ONCE DAILY AT NIGHT AS NEEDED    ESTRADIOL (ESTRACE) 0.01 % (0.1 MG/GRAM) VAGINAL CREAM    Place 1 g vaginally twice a week.    GABAPENTIN (NEURONTIN) 100 MG CAPSULE    Take 100 mg by mouth 3 (three) times daily.    METOPROLOL TARTRATE (LOPRESSOR) 25 MG TABLET    TAKE 1/2 (ONE-HALF) TABLET BY MOUTH TWICE DAILY     METOPROLOL TARTRATE (LOPRESSOR) 50 MG TABLET        NYSTATIN (MYCOSTATIN) POWDER    Apply to affected area 3 times daily    NYSTATIN-TRIAMCINOLONE (MYCOLOG) OINTMENT    Apply topically 2 (two) times daily.    SIMVASTATIN (ZOCOR) 40 MG TABLET    Take 40 mg by mouth every evening.        Past Social History:   Social History     Socioeconomic History    Marital status: Unknown    Number of children: 2   Occupational History    Occupation: Retired   Tobacco Use    Smoking status: Former     Types: Cigarettes     Quit date:      Years since quittin.2    Smokeless tobacco: Never   Substance and Sexual Activity    Alcohol use: Not Currently    Drug use: Not Currently    Sexual activity: Not Currently       Allergies:   Review of patient's allergies indicates:   Allergen Reactions    Vicodin [hydrocodone-acetaminophen] Hives        Family History:   Family History   Problem Relation Age of Onset    Lung cancer Father         Review of Systems:  Review of Systems   Constitutional:  Negative for activity change and appetite change.   HENT:  Negative for congestion and dental problem.    Respiratory:  Negative for chest tightness and shortness of breath.    Cardiovascular:  Negative for chest pain.   Gastrointestinal:  Negative for abdominal distention.   Genitourinary:  Negative for decreased urine volume, difficulty urinating, dyspareunia, dysuria, enuresis, flank pain, frequency, genital sores, hematuria, pelvic pain and urgency.   Musculoskeletal:  Negative for back pain and neck pain.   Allergic/Immunologic: Negative for immunocompromised state.   Neurological:  Negative for dizziness.   Hematological:  Negative for adenopathy.   Psychiatric/Behavioral:  Negative for agitation, behavioral problems and confusion.      Physical Exam:  Physical Exam  Vitals and nursing note reviewed.   Constitutional:       Appearance: She is well-developed.   HENT:      Head: Normocephalic.   Cardiovascular:      Rate and Rhythm:  Normal rate and regular rhythm.      Heart sounds: Normal heart sounds.   Pulmonary:      Effort: Pulmonary effort is normal.      Breath sounds: Normal breath sounds.   Abdominal:      General: Bowel sounds are normal.      Palpations: Abdomen is soft.   Skin:     General: Skin is warm and dry.   Neurological:      Mental Status: She is alert and oriented to person, place, and time.     Urinalysis: Normal    Assessment/Plan:   Renal lesion:  CT results reviewed with Dr. Jaramillo.    Will plan repeat CT renal mass protocol in 5 months with follow-up in 6 months with Dr. Jaramillo.      Follow-up 6 months, sooner if needed.  Problem List Items Addressed This Visit    None  Visit Diagnoses       Renal lesion        Relevant Orders    CT Abdomen Pelvis W Wo Contrast    Creatinine, serum    POCT Urinalysis (w/Micro Option)

## 2023-03-31 ENCOUNTER — TELEPHONE (OUTPATIENT)
Dept: UROLOGY | Facility: CLINIC | Age: 78
End: 2023-03-31
Payer: MEDICARE

## 2023-03-31 NOTE — TELEPHONE ENCOUNTER
----- Message from Harper Manzo sent at 3/31/2023  4:15 PM CDT -----  Regarding: FW: referral info  Contact: Angélica    ----- Message -----  From: Chioma Zimmerman LPN  Sent: 3/31/2023   3:33 PM CDT  To: Carroll Verdugo Staff  Subject: FW: referral info                                Pt saw Li yesterday not sure what he ordered at [facility]   ----- Message -----  From: Antonella Shaw  Sent: 3/31/2023   3:14 PM CDT  To: Clint Gotti Staff  Subject: referral info                                    Angélica from [facility] received the referral for the PT but they are needing orders sent over before her appt for a CMP or BMP, return call to [phone] and fax to [phone]

## 2023-04-03 ENCOUNTER — TELEPHONE (OUTPATIENT)
Dept: OBSTETRICS AND GYNECOLOGY | Facility: CLINIC | Age: 78
End: 2023-04-03
Payer: MEDICARE

## 2023-04-03 ENCOUNTER — TELEPHONE (OUTPATIENT)
Dept: UROLOGY | Facility: CLINIC | Age: 78
End: 2023-04-03
Payer: MEDICARE

## 2023-04-03 NOTE — TELEPHONE ENCOUNTER
Blood work orders sent to [facility] by BRETT Hernandes and information verified with patient. Verbalized understanding.

## 2023-04-03 NOTE — TELEPHONE ENCOUNTER
----- Message from Harper Manzo sent at 3/31/2023  4:15 PM CDT -----  Regarding: FW: referral info  Contact: Angélica    ----- Message -----  From: Chioma Zimmerman LPN  Sent: 3/31/2023   3:33 PM CDT  To: Carroll Verdugo Staff  Subject: FW: referral info                                Pt saw Li yesterday not sure what he ordered at [facility]   ----- Message -----  From: Antonella Shaw  Sent: 3/31/2023   3:14 PM CDT  To: Clint Gotti Staff  Subject: referral info                                    Angélica from [facility] received the referral for the PT but they are needing orders sent over before her appt for a CMP or BMP, return call to [phone] and fax to [phone]

## 2023-05-01 ENCOUNTER — TELEPHONE (OUTPATIENT)
Dept: UROLOGY | Facility: CLINIC | Age: 78
End: 2023-05-01
Payer: MEDICARE

## 2023-05-01 NOTE — TELEPHONE ENCOUNTER
Pt is ordered to do CT in Sept 2023, 1 mth prior to ov with Clint. I explained labs to be done prior to CT. Pt states understanding.

## 2023-05-01 NOTE — TELEPHONE ENCOUNTER
----- Message from Babita Wallace sent at 5/1/2023  8:41 AM CDT -----  Regarding: Blood Work  Contact: patient  Per phone call with patient, she stated that the [facility] has been reminding her she has an order there for March for blood work.  She stated that she has a CT Scan in September 2023 and Kartik Hodges in October 2023 and she wants to know can she have the lab work done in September 2023.  Please return call at [phone] (home).    Thanks,  SJ

## 2023-06-19 ENCOUNTER — TELEPHONE (OUTPATIENT)
Dept: GASTROENTEROLOGY | Facility: CLINIC | Age: 78
End: 2023-06-19
Payer: MEDICARE

## 2023-06-19 NOTE — TELEPHONE ENCOUNTER
Returned callers call and she states she will wait to scheduled her colonoscopy w/ NBP when she has her appt in July

## 2023-06-19 NOTE — TELEPHONE ENCOUNTER
----- Message from Tiffany Moreland sent at 6/15/2023  8:08 AM CDT -----  Contact: Pt  pt received a letter to rubi her colon test / wants to have it done after 07/18//thanks/lois

## 2023-07-18 ENCOUNTER — OFFICE VISIT (OUTPATIENT)
Dept: GASTROENTEROLOGY | Facility: CLINIC | Age: 78
End: 2023-07-18
Payer: MEDICARE

## 2023-07-18 VITALS
DIASTOLIC BLOOD PRESSURE: 77 MMHG | OXYGEN SATURATION: 93 % | HEIGHT: 62 IN | WEIGHT: 195 LBS | BODY MASS INDEX: 35.88 KG/M2 | HEART RATE: 59 BPM | SYSTOLIC BLOOD PRESSURE: 135 MMHG

## 2023-07-18 DIAGNOSIS — Z85.038 HISTORY OF COLON CANCER: ICD-10-CM

## 2023-07-18 DIAGNOSIS — Z86.010 HISTORY OF COLON POLYPS: ICD-10-CM

## 2023-07-18 DIAGNOSIS — K64.9 HEMORRHOIDS, UNSPECIFIED HEMORRHOID TYPE: ICD-10-CM

## 2023-07-18 DIAGNOSIS — R19.8 RECTAL DISCHARGE: Primary | ICD-10-CM

## 2023-07-18 PROCEDURE — 99214 PR OFFICE/OUTPT VISIT, EST, LEVL IV, 30-39 MIN: ICD-10-PCS | Mod: S$GLB,,, | Performed by: INTERNAL MEDICINE

## 2023-07-18 PROCEDURE — 99214 OFFICE O/P EST MOD 30 MIN: CPT | Mod: S$GLB,,, | Performed by: INTERNAL MEDICINE

## 2023-07-18 RX ORDER — NITROGLYCERIN 0.4 MG/1
TABLET SUBLINGUAL
COMMUNITY
Start: 2023-07-02

## 2023-07-18 RX ORDER — LORATADINE 10 MG/1
10 TABLET ORAL
COMMUNITY
Start: 2023-04-19

## 2023-07-18 RX ORDER — SOD SULF/POT CHLORIDE/MAG SULF 1.479 G
12 TABLET ORAL DAILY
Qty: 24 TABLET | Refills: 0 | Status: SHIPPED | OUTPATIENT
Start: 2023-07-18 | End: 2024-03-26

## 2023-07-18 NOTE — PATIENT INSTRUCTIONS
Schedule colonoscopy.   May try taking fiber gummies, start with 2 gummies daily and may increase to 4 daily after a few weeks if doing well.    Please notify my office if you have not been contacted within two weeks after any procedures, submitting any samples (biopsies, blood, stool, urine, etc.) or after any imaging (X-ray, CT, MRI, etc.).

## 2023-07-18 NOTE — LETTER
July 18, 2023        Robert Mcgowan MD  [address]  [address]  [place] [address]             [place] [facility]  [address] [address]  Phone: [phone]  Fax: [phone]   Patient: Lisbet Carrazna   MR Number: [id number]   YOB: 1945   Date of Visit: 7/18/2023       Dear Dr. Mcgowan:    Thank you for referring Lisbet Carranza to me for evaluation. Attached you will find relevant portions of my assessment and plan of care.    If you have questions, please do not hesitate to call me. I look forward to following Lisbet Carranza along with you.    Sincerely,      Ebonie Ellsworth MD            CC  No Recipients    Enclosure

## 2023-08-23 LAB
CREAT SERPL-MCNC: 1.01 MG/DL (ref 0.5–0.9)
GFR ESTIMATION: 57.69 ML/MIN/1.73M2

## 2023-10-03 ENCOUNTER — OFFICE VISIT (OUTPATIENT)
Dept: UROLOGY | Facility: CLINIC | Age: 78
End: 2023-10-03
Payer: MEDICARE

## 2023-10-03 VITALS — OXYGEN SATURATION: 97 % | HEART RATE: 58 BPM | SYSTOLIC BLOOD PRESSURE: 172 MMHG | DIASTOLIC BLOOD PRESSURE: 79 MMHG

## 2023-10-03 DIAGNOSIS — N28.9 RENAL LESION: Primary | ICD-10-CM

## 2023-10-03 PROCEDURE — 99214 OFFICE O/P EST MOD 30 MIN: CPT | Mod: S$GLB,,, | Performed by: UROLOGY

## 2023-10-03 PROCEDURE — 99214 PR OFFICE/OUTPT VISIT, EST, LEVL IV, 30-39 MIN: ICD-10-PCS | Mod: S$GLB,,, | Performed by: UROLOGY

## 2023-10-03 RX ORDER — AMOXICILLIN AND CLAVULANATE POTASSIUM 875; 125 MG/1; MG/1
TABLET, FILM COATED ORAL
COMMUNITY
Start: 2023-10-01

## 2023-10-03 NOTE — PROGRESS NOTES
Subjective:       Patient ID: Lisbet Carranza is a 78 y.o. female.    Chief Complaint: Follow-up (CT results/ blood work)      HPI: 78-year-old female patient following up for renal lesion.  Patient has CT on 02/27/2023 showing a small 1.3 cm lesion in the left kidney.  Patient recently had repeat CT that identify showed a 1.4 cm lesion unchanged from the initial CT.  As well as a 2 mm nonobstructing left renal calculus.      Past Medical History:   Past Medical History:   Diagnosis Date    Chronic back pain     Colon cancer 2005    s/p resection    High cholesterol     History of aortic stenosis     Hypertension     Left bundle branch block     Obesity, Class II, BMI 35-39.9, isolated (see actual BMI)     Personal history of colonic polyps        Past Surgical Historical:   Past Surgical History:   Procedure Laterality Date    AORTIC VALVULOPLASTY      bovine    APPENDECTOMY      CHOLECYSTECTOMY      CHOLECYSTOTOMY      RIGHT HEMICOLECTOMY  2005    TOTAL ABDOMINAL HYSTERECTOMY      TOTAL REPLACEMENT OF BOTH KNEES USING COMPUTER-ASSISTED NAVIGATION          Medications:   Medication List with Changes/Refills   Current Medications    AMLODIPINE (NORVASC) 5 MG TABLET    Take 5 mg by mouth.    AMOXICILLIN-CLAVULANATE 875-125MG (AUGMENTIN) 875-125 MG PER TABLET        ASPIRIN (ECOTRIN) 81 MG EC TABLET    Take 81 mg by mouth once daily.    CLOBETASOL (TEMOVATE) 0.05 % CREAM    APPLY ON THE SKIN TWICE A DAY    DIAZEPAM (VALIUM) 5 MG TABLET    TAKE 1 TABLET BY MOUTH ONCE DAILY AT NIGHT AS NEEDED    ESTRADIOL (ESTRACE) 0.01 % (0.1 MG/GRAM) VAGINAL CREAM    Place 1 g vaginally twice a week.    GABAPENTIN (NEURONTIN) 100 MG CAPSULE    Take 100 mg by mouth 3 (three) times daily.    LORATADINE (CLARITIN) 10 MG TABLET    Take 10 mg by mouth.    METOPROLOL TARTRATE (LOPRESSOR) 25 MG TABLET    TAKE 1/2 (ONE-HALF) TABLET BY MOUTH TWICE DAILY    METOPROLOL TARTRATE (LOPRESSOR) 50 MG TABLET        NITROGLYCERIN (NITROSTAT) 0.4 MG SL  TABLET        SIMVASTATIN (ZOCOR) 40 MG TABLET    Take 40 mg by mouth every evening.    SOD SULF-POT CHLORIDE-MAG SULF (SUTAB) 1.479-0.188- 0.225 GRAM TABLET    Take 12 tablets by mouth once daily. Take according to package instructions with indicated amount of water. No breakfast day before test. May substitute with Suprep, Clenpiq, Plenvu, Moviprep or GoLytely based on Rx plan and patient preference.        Past Social History:   Social History     Socioeconomic History    Marital status: Unknown    Number of children: 2   Occupational History    Occupation: Retired   Tobacco Use    Smoking status: Former     Current packs/day: 0.00     Types: Cigarettes     Quit date:      Years since quittin.7    Smokeless tobacco: Never   Substance and Sexual Activity    Alcohol use: Not Currently    Drug use: Not Currently    Sexual activity: Not Currently       Allergies:   Review of patient's allergies indicates:   Allergen Reactions    Vicodin [hydrocodone-acetaminophen] Hives        Family History:   Family History   Problem Relation Age of Onset    Lung cancer Father     Cervical cancer Sister         Review of Systems:  Review of Systems   Constitutional:  Negative for activity change, appetite change, chills, diaphoresis, fatigue, fever and unexpected weight change.   HENT:  Negative for congestion, dental problem, drooling, ear discharge, ear pain, facial swelling, hearing loss, mouth sores, nosebleeds, postnasal drip, rhinorrhea, sinus pressure, sinus pain, sneezing, sore throat, tinnitus, trouble swallowing and voice change.    Eyes:  Negative for photophobia, pain, discharge, redness, itching and visual disturbance.   Respiratory:  Negative for apnea, cough, choking, chest tightness, shortness of breath, wheezing and stridor.    Cardiovascular:  Negative for chest pain and leg swelling.   Gastrointestinal:  Negative for abdominal distention, abdominal pain, anal bleeding, blood in stool, constipation,  diarrhea, nausea, rectal pain and vomiting.   Endocrine: Negative for cold intolerance, heat intolerance, polydipsia, polyphagia and polyuria.   Genitourinary: Negative.  Negative for decreased urine volume, difficulty urinating, dyspareunia, dysuria, enuresis, flank pain, frequency, genital sores, hematuria, menstrual problem, pelvic pain, urgency, vaginal bleeding, vaginal discharge and vaginal pain.   Musculoskeletal:  Negative for arthralgias, back pain, gait problem, joint swelling, myalgias, neck pain and neck stiffness.   Skin:  Negative for color change, pallor, rash and wound.   Allergic/Immunologic: Negative for environmental allergies, food allergies and immunocompromised state.   Neurological:  Negative for dizziness, tremors, seizures, syncope, facial asymmetry, speech difficulty, weakness, light-headedness, numbness and headaches.   Hematological:  Negative for adenopathy. Does not bruise/bleed easily.   Psychiatric/Behavioral:  Negative for agitation, behavioral problems, confusion, decreased concentration, dysphoric mood, hallucinations, self-injury, sleep disturbance and suicidal ideas. The patient is not nervous/anxious and is not hyperactive.        Physical Exam:  Physical Exam  Exam conducted with a chaperone present.   Constitutional:       Appearance: Normal appearance.   HENT:      Head: Normocephalic.      Nose: Nose normal.      Mouth/Throat:      Mouth: Mucous membranes are moist.      Pharynx: Oropharynx is clear.   Eyes:      Extraocular Movements: Extraocular movements intact.      Conjunctiva/sclera: Conjunctivae normal.      Pupils: Pupils are equal, round, and reactive to light.   Cardiovascular:      Rate and Rhythm: Normal rate and regular rhythm.      Pulses: Normal pulses.      Heart sounds: Normal heart sounds.   Pulmonary:      Effort: Pulmonary effort is normal.      Breath sounds: Normal breath sounds.   Abdominal:      General: Abdomen is flat. Bowel sounds are normal.       Palpations: Abdomen is soft.      Hernia: There is no hernia in the left inguinal area or right inguinal area.   Genitourinary:     General: Normal vulva.      Pubic Area: No rash or pubic lice.       Labia:         Right: No rash, tenderness, lesion or injury.         Left: No rash, tenderness, lesion or injury.       Urethra: No prolapse, urethral pain, urethral swelling or urethral lesion.      Rectum: Normal.   Musculoskeletal:         General: Normal range of motion.      Cervical back: Normal range of motion and neck supple.   Lymphadenopathy:      Lower Body: No right inguinal adenopathy. No left inguinal adenopathy.   Skin:     General: Skin is warm and dry.      Capillary Refill: Capillary refill takes less than 2 seconds.   Neurological:      General: No focal deficit present.      Mental Status: She is alert and oriented to person, place, and time. Mental status is at baseline.   Psychiatric:         Mood and Affect: Mood normal.         Behavior: Behavior normal.         Thought Content: Thought content normal.         Judgment: Judgment normal.         Assessment/Plan:     Renal lesion:  Reassured the patient this is a benign finding.  We will repeat imaging in 1 year and have the patient follow up.  Problem List Items Addressed This Visit    None  Visit Diagnoses       Renal lesion    -  Primary    Relevant Orders    US Retroperitoneal Complete

## 2023-10-27 ENCOUNTER — TELEPHONE (OUTPATIENT)
Dept: GASTROENTEROLOGY | Facility: CLINIC | Age: 78
End: 2023-10-27
Payer: MEDICARE

## 2023-10-27 VITALS — WEIGHT: 195 LBS | BODY MASS INDEX: 35.88 KG/M2 | HEIGHT: 62 IN

## 2023-10-27 DIAGNOSIS — Z85.038 HISTORY OF COLON CANCER: Primary | ICD-10-CM

## 2023-10-27 DIAGNOSIS — Z86.010 HISTORY OF COLON POLYPS: ICD-10-CM

## 2023-10-27 NOTE — TELEPHONE ENCOUNTER
"[facility]  [address] [place] [address]  Phone: [phone]  Fax: [phone]    History & Physical         Provider: Dr. Ebonie Ellsworth    Patient Name: Lisbet DEGROOT (age):1945  78 y.o.           Gender: female   Phone: [phone]     Referring Physician: Robert Mcgowan     Vital Signs:   Height - 5' 2"  Weight - 195 LB  BMI -  35.67    Plan: COLONOSCOPY @ [facility]    Encounter Diagnoses   Name Primary?    History of colon cancer Yes    History of colon polyps            History:      Past Medical History:   Diagnosis Date    Chronic back pain     Colon cancer     s/p resection    High cholesterol     History of aortic stenosis     Hypertension     Left bundle branch block     Obesity, Class II, BMI 35-39.9, isolated (see actual BMI)     Personal history of colonic polyps       Past Surgical History:   Procedure Laterality Date    AORTIC VALVULOPLASTY      bovine    APPENDECTOMY      CHOLECYSTECTOMY      CHOLECYSTOTOMY      RIGHT HEMICOLECTOMY      TOTAL ABDOMINAL HYSTERECTOMY      TOTAL REPLACEMENT OF BOTH KNEES USING COMPUTER-ASSISTED NAVIGATION        Medication List with Changes/Refills   Current Medications    AMLODIPINE (NORVASC) 5 MG TABLET    Take 5 mg by mouth.    AMOXICILLIN-CLAVULANATE 875-125MG (AUGMENTIN) 875-125 MG PER TABLET        ASPIRIN (ECOTRIN) 81 MG EC TABLET    Take 81 mg by mouth once daily.    CLOBETASOL (TEMOVATE) 0.05 % CREAM    APPLY ON THE SKIN TWICE A DAY    DIAZEPAM (VALIUM) 5 MG TABLET    TAKE 1 TABLET BY MOUTH ONCE DAILY AT NIGHT AS NEEDED    ESTRADIOL (ESTRACE) 0.01 % (0.1 MG/GRAM) VAGINAL CREAM    Place 1 g vaginally twice a week.    GABAPENTIN (NEURONTIN) 100 MG CAPSULE    Take 100 mg by mouth 3 (three) times daily.    LORATADINE (CLARITIN) 10 MG TABLET    Take 10 mg by mouth.    METOPROLOL TARTRATE (LOPRESSOR) 25 MG TABLET    TAKE 1/2 " (ONE-HALF) TABLET BY MOUTH TWICE DAILY    METOPROLOL TARTRATE (LOPRESSOR) 50 MG TABLET        NITROGLYCERIN (NITROSTAT) 0.4 MG SL TABLET        SIMVASTATIN (ZOCOR) 40 MG TABLET    Take 40 mg by mouth every evening.    SOD SULF-POT CHLORIDE-MAG SULF (SUTAB) 1.479-0.188- 0.225 GRAM TABLET    Take 12 tablets by mouth once daily. Take according to package instructions with indicated amount of water. No breakfast day before test. May substitute with Suprep, Clenpiq, Plenvu, Moviprep or GoLytely based on Rx plan and patient preference.      Review of patient's allergies indicates:   Allergen Reactions    Vicodin [hydrocodone-acetaminophen] Hives      Family History   Problem Relation Age of Onset    Lung cancer Father     Cervical cancer Sister       Social History     Tobacco Use    Smoking status: Former     Current packs/day: 0.00     Types: Cigarettes     Quit date:      Years since quittin.8    Smokeless tobacco: Never   Substance Use Topics    Alcohol use: Not Currently    Drug use: Not Currently        Physical Examination:     General Appearance:___________________________  HEENT: _____________________________________  Abdomen:____________________________________  Heart:________________________________________  Lungs:_______________________________________  Extremities:___________________________________  Skin:_________________________________________  Endocrine:____________________________________  Genitourinary:_________________________________  Neurological:__________________________________      Patient has been evaluated immediately prior to sedation and is medically cleared for endoscopy with IVCS as an ASA class: ______      Physician Signature: _________________________       Date: ________  Time: ________

## 2023-11-01 ENCOUNTER — OUTSIDE PLACE OF SERVICE (OUTPATIENT)
Dept: GASTROENTEROLOGY | Facility: CLINIC | Age: 78
End: 2023-11-01

## 2023-11-01 LAB — CRC RECOMMENDATION EXT: NORMAL

## 2023-11-01 PROCEDURE — G0105 COLORECTAL SCRN; HI RISK IND: HCPCS | Mod: ,,, | Performed by: INTERNAL MEDICINE

## 2023-11-01 PROCEDURE — G0105 COLORECTAL SCRN; HI RISK IND: ICD-10-PCS | Mod: ,,, | Performed by: INTERNAL MEDICINE

## 2023-12-11 ENCOUNTER — DOCUMENTATION ONLY (OUTPATIENT)
Dept: GASTROENTEROLOGY | Facility: CLINIC | Age: 78
End: 2023-12-11

## 2024-03-26 ENCOUNTER — OFFICE VISIT (OUTPATIENT)
Dept: OBSTETRICS AND GYNECOLOGY | Facility: CLINIC | Age: 79
End: 2024-03-26
Payer: MEDICARE

## 2024-03-26 VITALS
HEART RATE: 60 BPM | DIASTOLIC BLOOD PRESSURE: 58 MMHG | SYSTOLIC BLOOD PRESSURE: 156 MMHG | HEIGHT: 62 IN | BODY MASS INDEX: 35.01 KG/M2 | WEIGHT: 190.25 LBS

## 2024-03-26 DIAGNOSIS — N95.2 ATROPHIC VAGINITIS: ICD-10-CM

## 2024-03-26 DIAGNOSIS — Z12.31 SCREENING MAMMOGRAM, ENCOUNTER FOR: Primary | ICD-10-CM

## 2024-03-26 DIAGNOSIS — B37.31 YEAST VAGINITIS: ICD-10-CM

## 2024-03-26 DIAGNOSIS — M85.852 OSTEOPENIA OF LEFT HIP: ICD-10-CM

## 2024-03-26 PROCEDURE — G0101 CA SCREEN;PELVIC/BREAST EXAM: HCPCS | Mod: GZ,,, | Performed by: NURSE PRACTITIONER

## 2024-03-26 RX ORDER — ESTRADIOL 0.1 MG/G
1 CREAM VAGINAL
Qty: 42.5 G | Refills: 2 | Status: SHIPPED | OUTPATIENT
Start: 2024-03-28

## 2024-03-26 RX ORDER — FLUCONAZOLE 150 MG/1
150 TABLET ORAL ONCE
Qty: 1 TABLET | Refills: 0 | Status: SHIPPED | OUTPATIENT
Start: 2024-03-26 | End: 2024-03-26

## 2024-03-26 NOTE — PROGRESS NOTES
"    Subjective:       Patient ID: Lisbet Carranza is a 79 y.o. female.    Chief Complaint:  Well Woman      History of Present Illness   Presents for annual gyn exam. History and past labs reviewed with patient.    Complains of vaginal itching  Not sexually active   No hx of abnormal    OB History          3    Para   3    Term   3            AB        Living   3         SAB        IAB        Ectopic        Multiple        Live Births   3                  Review of Systems  Review of Systems   Constitutional:  Negative for chills and fever.   Respiratory:  Negative for shortness of breath.    Cardiovascular:  Negative for chest pain.   Gastrointestinal:  Negative for abdominal pain, blood in stool, constipation, diarrhea, nausea, vomiting and reflux.   Genitourinary:  Negative for dysmenorrhea, dyspareunia, dysuria, hematuria, hot flashes, menorrhagia, menstrual problem, pelvic pain, vaginal bleeding, vaginal discharge, postcoital bleeding and vaginal dryness.   Musculoskeletal:  Negative for arthralgias and joint swelling.   Integumentary:  Negative for rash, hair changes, breast mass, nipple discharge and breast skin changes.   Psychiatric/Behavioral:  Negative for depression. The patient is not nervous/anxious.    Breast: Negative for asymmetry, lump, mass, nipple discharge and skin changes          Objective:     Vitals:    24 1033   BP: (!) 156/58   Pulse: 60   Weight: 86.3 kg (190 lb 4 oz)   Height: 5' 2" (1.575 m)        Physical Exam:   Constitutional: She is oriented to person, place, and time. She appears well-developed and well-nourished.    HENT:   Head: Normocephalic and atraumatic.    Eyes: Pupils are equal, round, and reactive to light. Conjunctivae are normal.    Neck: No thyromegaly present.    Cardiovascular:  Normal rate, regular rhythm and normal heart sounds.             Pulmonary/Chest: Effort normal and breath sounds normal.        Abdominal: Soft.     Genitourinary:    " Inguinal canal, vagina, uterus, right adnexa, left adnexa and rectum normal.      Pelvic exam was performed with patient supine.   The external female genitalia was normal.   Genitalia hair distrobution normal .     Labial bartholins normal.Cervix is normal. No vaginal discharge in the vagina. Vaginal atrophy noted. Uerus contour normal            Musculoskeletal: Normal range of motion and moves all extremeties.       Neurological: She is alert and oriented to person, place, and time. She has normal reflexes.    Skin: Skin is warm and dry.    Psychiatric: She has a normal mood and affect. Her behavior is normal. Judgment and thought content normal.       breast exam wnl- no nipple dc, skin changes, masses or lymph nodes palpated bilaterally    Slide examined under the microscope and there were per HPF  Clue cells -none  Hyphae-+many  Trichomonas-none   WBC-0-5      Assessment:     1. Screening mammogram, encounter for    2. Yeast vaginitis    3. Atrophic vaginitis    4. Osteopenia of left hip              Plan:       Screening mammogram, encounter for  -     Mammo Digital Screening Bilat; Future; Expected date: 03/26/2024    Yeast vaginitis  -     fluconazole (DIFLUCAN) 150 MG Tab; Take 1 tablet (150 mg total) by mouth once. for 1 dose  Dispense: 1 tablet; Refill: 0    Atrophic vaginitis  -     estradioL (ESTRACE) 0.01 % (0.1 mg/gram) vaginal cream; Place 1 g vaginally twice a week.  Dispense: 42.5 g; Refill: 2    Osteopenia of left hip  -     DXA Bone Density with Vertebral Fracture; Future; Expected date: 03/26/2024         Colonoscopy UTD  BMD +osteopenia   Vaccine UTD   Risk assessment for inherited gyn cancer done   Women's daily Multi vitamin  Healthy diet, exercise, and life style   Patient was counseled today on A.C.S. Pap guidelines and recommendations for yearly pelvic exams, mammograms and monthly self breast exams; to see her PCP for other health maintenance.      Follow up in about 1 year (around  3/26/2025).

## 2024-04-15 ENCOUNTER — TELEPHONE (OUTPATIENT)
Dept: GASTROENTEROLOGY | Facility: CLINIC | Age: 79
End: 2024-04-15
Payer: MEDICARE

## 2024-04-15 NOTE — TELEPHONE ENCOUNTER
Scheduled pt to see [facility] 4/25/24 due to changes in bowel habits. -kg

## 2024-04-15 NOTE — TELEPHONE ENCOUNTER
----- Message from Ashleigh Abarca sent at 4/15/2024 10:51 AM CDT -----  Contact: Patient  Patient called to consult with nurse or staff regarding an appointment. She states she romero a change in her bowel and wanted to come in for an appointment. She would like a vivienne back and can be reached at [phone]. Thanks/MR

## 2024-04-17 ENCOUNTER — TELEPHONE (OUTPATIENT)
Dept: OBSTETRICS AND GYNECOLOGY | Facility: CLINIC | Age: 79
End: 2024-04-17
Payer: MEDICARE

## 2024-04-17 NOTE — TELEPHONE ENCOUNTER
--  Spoke with the patient and notified her of the results and recommendations. Patient verbalized understanding, [place]          --- Message from Bonita Ponce NP sent at 4/17/2024  4:18 PM CDT -----  She has osteopenia. She needs to take calcium and vit D daily along with weight bearing exercises

## 2024-04-25 ENCOUNTER — OFFICE VISIT (OUTPATIENT)
Dept: GASTROENTEROLOGY | Facility: CLINIC | Age: 79
End: 2024-04-25
Payer: MEDICARE

## 2024-04-25 VITALS
HEIGHT: 62 IN | BODY MASS INDEX: 34.82 KG/M2 | SYSTOLIC BLOOD PRESSURE: 148 MMHG | OXYGEN SATURATION: 96 % | WEIGHT: 189.19 LBS | DIASTOLIC BLOOD PRESSURE: 79 MMHG | HEART RATE: 62 BPM

## 2024-04-25 DIAGNOSIS — R19.4 CHANGE IN BOWEL HABITS: ICD-10-CM

## 2024-04-25 DIAGNOSIS — K64.9 HEMORRHOIDS, UNSPECIFIED HEMORRHOID TYPE: ICD-10-CM

## 2024-04-25 DIAGNOSIS — Z86.010 HISTORY OF COLON POLYPS: ICD-10-CM

## 2024-04-25 DIAGNOSIS — Z85.038 HISTORY OF COLON CANCER: ICD-10-CM

## 2024-04-25 DIAGNOSIS — K59.00 CONSTIPATION, UNSPECIFIED CONSTIPATION TYPE: Primary | ICD-10-CM

## 2024-04-25 PROCEDURE — 99214 OFFICE O/P EST MOD 30 MIN: CPT | Mod: S$GLB,,,

## 2024-04-25 NOTE — PATIENT INSTRUCTIONS
Begin taking MiraLAX. Start with 1/2 capful daily, and titrate dose up or down until you are having daily, soft bowel movements.  Begin using Squatty Potty with each restroom visit at home.     Please notify my office if you have not been contacted within two weeks after any procedures, submitting any samples (biopsies, blood, stool, urine, etc.) or after any imaging (X-ray, CT, MRI, etc.).

## 2024-04-25 NOTE — PROGRESS NOTES
Clinic Note    Reason for visit:  The primary encounter diagnosis was Constipation, unspecified constipation type. Diagnoses of Change in bowel habits, History of colon cancer, History of colon polyps, and Hemorrhoids, unspecified hemorrhoid type were also pertinent to this visit.    PCP: Robert Mcgowan       HPI:  This is a 79 y.o. female who is here for a follow up. Hard of hearing.  History of colon cancer stage I in 2005, s/p right hemicolectomy. She reports one day she may have to strain to have a BM then sometimes feels she is not completely emptying. She is taking fiber, one stool softener, a piece of fruit or drinks condensed milk to have a BM but sometimes still doesn't go and will do enema. Other days she has no issues passing stool.     Colonoscopy 11/1/2023:  Healthy appearing ileocolonic anastomosis in right colon.  Sigmoid diverticulosis with some associated luminal narrowing. IH.  Small amount of fecal contamination, normal colonic mucosa otherwise.  Repeat colonoscopy in 3 years.    CT AP PO/IV 9/2023: No GB. Calcified granulomata of liver/spleen. Small HH. Multiple colonic diverticula. Stable 1.4 cm hyperdense lesion of the left kidney favored to be a hyperdense cyst.     CT abd w/ and w/o IV 2/2023: No GB, calcified granulomas of spleen, small 1.3 cm lesion in the left kidney measures 34 Hounsfield units precontrast in 50 Hounsfield units postcontrast which suggest mild enhancement.  This is suspicious for renal neoplasm however due to small size is also possible change in density secondary to volume averaging.  Following up with urology.      Had colonoscopy 5/9/2022 with Dr. Merritt, due to having rectal bleeding at that time, who reported multiple large diverticula in descending/sigmoid colon and multiple medium-sized IH.    Review of Systems   Constitutional:  Negative for fatigue, fever and unexpected weight change.   HENT:  Negative for mouth sores, postnasal drip, sore throat and trouble  swallowing.    Eyes:  Negative for pain, discharge and eye dryness.   Respiratory:  Negative for apnea, cough, choking, chest tightness, shortness of breath and wheezing.    Cardiovascular:  Negative for chest pain, palpitations and leg swelling.   Gastrointestinal:  Positive for change in bowel habit. Negative for abdominal distention, abdominal pain, anal bleeding, blood in stool, constipation, diarrhea, nausea, rectal pain, vomiting, reflux and fecal incontinence.   Genitourinary:  Negative for bladder incontinence, dysuria and hematuria.   Musculoskeletal:  Negative for arthralgias, back pain and joint swelling.   Integumentary:  Negative for color change and rash.   Allergic/Immunologic: Negative for environmental allergies and food allergies.   Neurological:  Negative for seizures and headaches.   Hematological:  Negative for adenopathy. Does not bruise/bleed easily.        Past Medical History:   Diagnosis Date    Chronic back pain     Colon cancer 2005    s/p resection    High cholesterol     History of aortic stenosis     Hypertension     Left bundle branch block     Obesity, Class II, BMI 35-39.9, isolated (see actual BMI)     Personal history of colonic polyps      Past Surgical History:   Procedure Laterality Date    AORTIC VALVULOPLASTY      bovine    APPENDECTOMY      CHOLECYSTECTOMY      CHOLECYSTOTOMY      RIGHT HEMICOLECTOMY  2005    TOTAL ABDOMINAL HYSTERECTOMY      TOTAL REPLACEMENT OF BOTH KNEES USING COMPUTER-ASSISTED NAVIGATION       Family History   Problem Relation Name Age of Onset    Lung cancer Father      Cervical cancer Sister      Colon cancer Neg Hx      Crohn's disease Neg Hx      Esophageal cancer Neg Hx      Irritable bowel syndrome Neg Hx      Liver cancer Neg Hx      Liver disease Neg Hx      Rectal cancer Neg Hx      Stomach cancer Neg Hx       Social History     Tobacco Use    Smoking status: Former     Current packs/day: 0.00     Types: Cigarettes     Quit date: 2005     Years  "since quittin.3    Smokeless tobacco: Never   Substance Use Topics    Alcohol use: Not Currently    Drug use: Not Currently     Review of patient's allergies indicates:   Allergen Reactions    Vicodin [hydrocodone-acetaminophen] Hives      Medication List with Changes/Refills   Current Medications    AMLODIPINE (NORVASC) 5 MG TABLET    Take 5 mg by mouth.    ASPIRIN (ECOTRIN) 81 MG EC TABLET    Take 81 mg by mouth once daily.    CLOBETASOL (TEMOVATE) 0.05 % CREAM    APPLY ON THE SKIN TWICE A DAY    DIAZEPAM (VALIUM) 5 MG TABLET    TAKE 1 TABLET BY MOUTH ONCE DAILY AT NIGHT AS NEEDED    ESTRADIOL (ESTRACE) 0.01 % (0.1 MG/GRAM) VAGINAL CREAM    Place 1 g vaginally twice a week.    GABAPENTIN (NEURONTIN) 100 MG CAPSULE    Take 100 mg by mouth 3 (three) times daily.    LORATADINE (CLARITIN) 10 MG TABLET    Take 10 mg by mouth.    METOPROLOL TARTRATE (LOPRESSOR) 25 MG TABLET    TAKE 1/2 (ONE-HALF) TABLET BY MOUTH TWICE DAILY    METOPROLOL TARTRATE (LOPRESSOR) 50 MG TABLET        NITROGLYCERIN (NITROSTAT) 0.4 MG SL TABLET        SIMVASTATIN (ZOCOR) 40 MG TABLET    Take 40 mg by mouth every evening.   Discontinued Medications    AMOXICILLIN-CLAVULANATE 875-125MG (AUGMENTIN) 875-125 MG PER TABLET             Vital Signs:  BP (!) 148/79 (BP Location: Left arm, Patient Position: Sitting)   Pulse 62   Ht 5' 2" (1.575 m)   Wt 85.8 kg (189 lb 3.2 oz)   SpO2 96%   BMI 34.61 kg/m²        Physical Exam  Vitals reviewed.   Constitutional:       General: She is awake. She is not in acute distress.     Appearance: Normal appearance. She is well-developed. She is not ill-appearing, toxic-appearing or diaphoretic.   HENT:      Head: Normocephalic and atraumatic.      Nose: Nose normal.      Mouth/Throat:      Mouth: Mucous membranes are moist.      Pharynx: Oropharynx is clear. No oropharyngeal exudate or posterior oropharyngeal erythema.   Eyes:      General: Lids are normal. Gaze aligned appropriately. No scleral icterus.   "      Right eye: No discharge.         Left eye: No discharge.      Conjunctiva/sclera: Conjunctivae normal.   Neck:      Trachea: Trachea normal.   Cardiovascular:      Rate and Rhythm: Normal rate and regular rhythm.      Pulses:           Radial pulses are 2+ on the right side and 2+ on the left side.   Pulmonary:      Effort: Pulmonary effort is normal. No respiratory distress.      Breath sounds: No stridor. No wheezing.   Chest:      Chest wall: No tenderness.   Abdominal:      General: Bowel sounds are normal. There is no distension.      Palpations: Abdomen is soft. There is no fluid wave, hepatomegaly or mass.      Tenderness: There is no abdominal tenderness. There is no guarding or rebound.   Musculoskeletal:         General: No tenderness or deformity.      Cervical back: Full passive range of motion without pain and neck supple. No tenderness.      Right lower leg: No edema.      Left lower leg: No edema.   Lymphadenopathy:      Cervical: No cervical adenopathy.   Skin:     General: Skin is warm and dry.      Capillary Refill: Capillary refill takes less than 2 seconds.      Coloration: Skin is not cyanotic, jaundiced or pale.   Neurological:      General: No focal deficit present.      Mental Status: She is alert and oriented to person, place, and time.      Motor: No tremor.   Psychiatric:         Attention and Perception: Attention normal.         Mood and Affect: Mood and affect normal.         Speech: Speech normal.         Behavior: Behavior normal. Behavior is cooperative.            All of the data above and below has been reviewed by myself and any further interpretations will be reflected in the assessment and plan.   The data includes review of external notes, and independent interpretation of lab results, procedures, x-rays, and imaging reports.      Assessment:  Constipation, unspecified constipation type    Change in bowel habits    History of colon cancer    History of colon  polyps    Hemorrhoids, unspecified hemorrhoid type    Colonoscopy due 2026. High risk.   Recent colonoscopy unrevealing. Having trouble evacuating stool. Discussed MiraLAX titration. Also discussed pelvic floor therapy. She will try MiraLAX first. Consider Linzess 72 mcg if MiraLAX does not help.   She will order Squatty Potty.     Recommendations:  Begin taking MiraLAX. Start with 1/2 capful daily, and titrate dose up or down until you are having daily, soft bowel movements.  Begin using Squatty Potty with each restroom visit at home.     Risks, benefits, and alternatives of medical management, any associated procedures, and/or treatment discussed with the patient. Patient given opportunity to ask questions and voices understanding. Patient has elected to proceed with the recommended care modalities as discussed.    Keep follow up visit with Dr. Ellsworth.     Order summary:  No orders of the defined types were placed in this encounter.       Instructed patient to notify my office if they have not been contacted within two weeks after any procedures, submitting any samples (biopsies, blood, stool, urine, etc.) or after any imaging (X-ray, CT, MRI, etc.).      Tanna Street NP    This document may have been created using a voice recognition transcribing system. Incorrect words or phrases may have been missed during proofreading. Please interpret accordingly or contact me for clarification.

## 2024-04-25 NOTE — LETTER
April 25, 2024        Robert Mcgowan MD  [address]  [address]  [place] [address]             [facility]  [address] [address]  Phone: [phone]  Fax: [phone]   Patient: Lisbet Carranza   MR Number: [id number]   YOB: 1945   Date of Visit: 4/25/2024       Dear Dr. Mcgowan:    Thank you for referring Lisbet Carranza to me for evaluation. Attached you will find relevant portions of my assessment and plan of care.    If you have questions, please do not hesitate to call me. I look forward to following Lisbet Carranza along with you.    Sincerely,      Tanna Street, NP            CC  No Recipients    Enclosure

## 2024-07-23 ENCOUNTER — OFFICE VISIT (OUTPATIENT)
Dept: GASTROENTEROLOGY | Facility: CLINIC | Age: 79
End: 2024-07-23
Payer: MEDICARE

## 2024-07-23 VITALS
SYSTOLIC BLOOD PRESSURE: 141 MMHG | RESPIRATION RATE: 16 BRPM | WEIGHT: 191 LBS | HEIGHT: 62 IN | BODY MASS INDEX: 35.15 KG/M2 | HEART RATE: 59 BPM | DIASTOLIC BLOOD PRESSURE: 76 MMHG | OXYGEN SATURATION: 95 %

## 2024-07-23 DIAGNOSIS — R19.4 CHANGE IN BOWEL HABITS: ICD-10-CM

## 2024-07-23 DIAGNOSIS — K64.9 HEMORRHOIDS, UNSPECIFIED HEMORRHOID TYPE: ICD-10-CM

## 2024-07-23 DIAGNOSIS — Z86.010 HISTORY OF COLON POLYPS: ICD-10-CM

## 2024-07-23 DIAGNOSIS — Z85.038 HISTORY OF COLON CANCER: ICD-10-CM

## 2024-07-23 DIAGNOSIS — K59.00 CONSTIPATION, UNSPECIFIED CONSTIPATION TYPE: Primary | ICD-10-CM

## 2024-07-23 PROCEDURE — 99214 OFFICE O/P EST MOD 30 MIN: CPT | Mod: S$GLB,,, | Performed by: INTERNAL MEDICINE

## 2024-07-23 NOTE — PROGRESS NOTES
Clinic Note    Reason for visit:  The primary encounter diagnosis was Constipation, unspecified constipation type. Diagnoses of Change in bowel habits, History of colon cancer, Hemorrhoids, unspecified hemorrhoid type, and History of colon polyps were also pertinent to this visit.    PCP: Robert Mcgowan       HPI:  This is a 79 y.o. female who is here for a follow up. Hard of hearing.  History of colon cancer stage I in 2005, s/p right hemicolectomy. Patient was having constipation at last OV and incomplete emptying/evacuation. She was advised to begin taking MiraLAX and get Squatty Potty. She has been taking MiraLAX 1/2 capful daily and this has been working well. May have 1-2 BMs daily. She stopped fiber because with the MiraLAX it caused diarrhea. She has Squatty Potty as well. No blood in stool.         Colonoscopy 11/1/2023:  Healthy appearing ileocolonic anastomosis in right colon.  Sigmoid diverticulosis with some associated luminal narrowing. IH.  Small amount of fecal contamination, normal colonic mucosa otherwise.  Repeat colonoscopy in 3 years.     CT AP PO/IV 9/2023: No GB. Calcified granulomata of liver/spleen. Small HH. Multiple colonic diverticula. Stable 1.4 cm hyperdense lesion of the left kidney favored to be a hyperdense cyst.      CT abd w/ and w/o IV 2/2023: No GB, calcified granulomas of spleen, small 1.3 cm lesion in the left kidney measures 34 Hounsfield units precontrast in 50 Hounsfield units postcontrast which suggest mild enhancement.  This is suspicious for renal neoplasm however due to small size is also possible change in density secondary to volume averaging.  Following up with urology.      Had colonoscopy 5/9/2022 with Dr. Merritt, due to having rectal bleeding at that time, who reported multiple large diverticula in descending/sigmoid colon and multiple medium-sized IH.    Review of Systems   Constitutional:  Negative for fatigue, fever and unexpected weight change.   HENT:   Negative for mouth sores, postnasal drip, sore throat and trouble swallowing.    Eyes:  Negative for pain, discharge and eye dryness.   Respiratory:  Negative for apnea, cough, choking, chest tightness, shortness of breath and wheezing.    Cardiovascular:  Negative for chest pain, palpitations and leg swelling.   Gastrointestinal:  Negative for abdominal distention, abdominal pain, anal bleeding, blood in stool, change in bowel habit, constipation, diarrhea, nausea, rectal pain, vomiting, reflux and fecal incontinence.   Genitourinary:  Negative for bladder incontinence, dysuria and hematuria.   Musculoskeletal:  Negative for arthralgias, back pain and joint swelling.   Integumentary:  Negative for color change and rash.   Allergic/Immunologic: Negative for environmental allergies and food allergies.   Neurological:  Negative for seizures and headaches.   Hematological:  Negative for adenopathy. Does not bruise/bleed easily.        Past Medical History:   Diagnosis Date    Chronic back pain     Colon cancer 2005    s/p resection    High cholesterol     History of aortic stenosis     Hypertension     Left bundle branch block     Obesity, Class II, BMI 35-39.9, isolated (see actual BMI)     Personal history of colonic polyps      Past Surgical History:   Procedure Laterality Date    AORTIC VALVULOPLASTY      bovine    APPENDECTOMY      CHOLECYSTECTOMY      CHOLECYSTOTOMY      RIGHT HEMICOLECTOMY  2005    TOTAL ABDOMINAL HYSTERECTOMY      TOTAL REPLACEMENT OF BOTH KNEES USING COMPUTER-ASSISTED NAVIGATION       Family History   Problem Relation Name Age of Onset    Lung cancer Father      Cervical cancer Sister      Colon cancer Neg Hx      Crohn's disease Neg Hx      Esophageal cancer Neg Hx      Irritable bowel syndrome Neg Hx      Liver cancer Neg Hx      Liver disease Neg Hx      Rectal cancer Neg Hx      Stomach cancer Neg Hx       Social History     Tobacco Use    Smoking status: Former     Current packs/day: 0.00  "    Types: Cigarettes     Quit date:      Years since quittin.5    Smokeless tobacco: Never   Substance Use Topics    Alcohol use: Not Currently    Drug use: Not Currently     Review of patient's allergies indicates:   Allergen Reactions    Vicodin [hydrocodone-acetaminophen] Hives        Medication List with Changes/Refills   Current Medications    AMLODIPINE (NORVASC) 5 MG TABLET    Take 5 mg by mouth.    ASPIRIN (ECOTRIN) 81 MG EC TABLET    Take 81 mg by mouth once daily.    CLOBETASOL (TEMOVATE) 0.05 % CREAM    APPLY ON THE SKIN TWICE A DAY    DIAZEPAM (VALIUM) 5 MG TABLET    TAKE 1 TABLET BY MOUTH ONCE DAILY AT NIGHT AS NEEDED    ESTRADIOL (ESTRACE) 0.01 % (0.1 MG/GRAM) VAGINAL CREAM    Place 1 g vaginally twice a week.    GABAPENTIN (NEURONTIN) 100 MG CAPSULE    Take 100 mg by mouth 3 (three) times daily.    LORATADINE (CLARITIN) 10 MG TABLET    Take 10 mg by mouth.    METOPROLOL TARTRATE (LOPRESSOR) 25 MG TABLET    TAKE 1/2 (ONE-HALF) TABLET BY MOUTH TWICE DAILY    METOPROLOL TARTRATE (LOPRESSOR) 50 MG TABLET    Take 50 mg by mouth 2 (two) times daily.    NITROGLYCERIN (NITROSTAT) 0.4 MG SL TABLET        SIMVASTATIN (ZOCOR) 40 MG TABLET    Take 40 mg by mouth every evening.         Vital Signs:  BP (!) 141/76 (BP Location: Left arm, Patient Position: Sitting, BP Method: Large (Automatic))   Pulse (!) 59   Resp 16   Ht 5' 2" (1.575 m)   Wt 86.6 kg (191 lb)   SpO2 95%   BMI 34.93 kg/m²         Physical Exam  Vitals reviewed.   Constitutional:       General: She is awake. She is not in acute distress.     Appearance: Normal appearance. She is well-developed. She is not ill-appearing, toxic-appearing or diaphoretic.   HENT:      Head: Normocephalic and atraumatic.      Nose: Nose normal.      Mouth/Throat:      Mouth: Mucous membranes are moist.      Pharynx: Oropharynx is clear. No oropharyngeal exudate or posterior oropharyngeal erythema.   Eyes:      General: Lids are normal. Gaze aligned " appropriately. No scleral icterus.        Right eye: No discharge.         Left eye: No discharge.      Conjunctiva/sclera: Conjunctivae normal.   Neck:      Trachea: Trachea normal.   Cardiovascular:      Rate and Rhythm: Normal rate and regular rhythm.      Pulses:           Radial pulses are 2+ on the right side and 2+ on the left side.   Pulmonary:      Effort: Pulmonary effort is normal. No respiratory distress.      Breath sounds: No stridor. No wheezing.   Chest:      Chest wall: No tenderness.   Abdominal:      General: Bowel sounds are normal. There is no distension.      Palpations: Abdomen is soft. There is no fluid wave, hepatomegaly or mass.      Tenderness: There is no abdominal tenderness. There is no guarding or rebound.   Musculoskeletal:         General: No tenderness or deformity.      Cervical back: Full passive range of motion without pain and neck supple. No tenderness.      Right lower leg: No edema.      Left lower leg: No edema.   Lymphadenopathy:      Cervical: No cervical adenopathy.   Skin:     General: Skin is warm and dry.      Capillary Refill: Capillary refill takes less than 2 seconds.      Coloration: Skin is not cyanotic, jaundiced or pale.   Neurological:      General: No focal deficit present.      Mental Status: She is alert and oriented to person, place, and time.      Motor: No tremor.   Psychiatric:         Attention and Perception: Attention normal.         Mood and Affect: Mood and affect normal.         Speech: Speech normal.         Behavior: Behavior normal. Behavior is cooperative.            All of the data above and below has been reviewed by myself and any further interpretations will be reflected in the assessment and plan.   The data includes review of external notes, and independent interpretation of lab results, procedures, x-rays, and imaging reports.      Assessment:  Constipation, unspecified constipation type    Change in bowel habits    History of colon  cancer    Hemorrhoids, unspecified hemorrhoid type    History of colon polyps    Colonoscopy due 2026. High risk.   Recent colonoscopy unrevealing. Constipation controlled with MiraLAX 1/2 cap daily and Squatty Potty. May consider pelvic floor therapy in future.      Recommendations:  Continue MiraLAX 1/2 capful daily. Notify my office with any issues.     Risks, benefits, and alternatives of medical management, any associated procedures, and/or treatment discussed with the patient. Patient given opportunity to ask questions and voices understanding. Patient has elected to proceed with the recommended care modalities as discussed.    Instructed patient to notify my office if they have not been contacted within two weeks after any procedures, submitting any samples (biopsies, blood, stool, urine, etc.) or after any imaging (X-ray, CT, MRI, etc.).     Follow up in about 1 year (around 7/23/2025). With NP    Order summary:  No orders of the defined types were placed in this encounter.     This assessment, plan, and documentation was performed in collaboration with Tanna Street NP.     This document may have been created using a voice recognition transcribing system. Incorrect words or phrases may have been missed during proofreading. Please interpret accordingly or contact me for clarification.     Ebonie Ellsworth MD

## 2024-07-23 NOTE — LETTER
July 23, 2024        Robert Mcgowan MD  [address]  [address]  [place] [address]             [facility]  [address] [address]  Phone: [phone]  Fax: [phone]   Patient: Lisbet Carranza   MR Number: [id number]   YOB: 1945   Date of Visit: 7/23/2024       Dear Dr. Mcgowan:    Thank you for referring Lisbet Carranza to me for evaluation. Attached you will find relevant portions of my assessment and plan of care.    If you have questions, please do not hesitate to call me. I look forward to following Lisbet Carranza along with you.    Sincerely,      Ebonie Ellsworth MD            CC  No Recipients    Enclosure

## 2024-08-30 ENCOUNTER — TELEPHONE (OUTPATIENT)
Dept: GASTROENTEROLOGY | Facility: CLINIC | Age: 79
End: 2024-08-30
Payer: MEDICARE

## 2024-08-30 RX ORDER — HYDROCORTISONE 25 MG/G
CREAM TOPICAL 2 TIMES DAILY
Qty: 30 G | Refills: 0 | Status: SHIPPED | OUTPATIENT
Start: 2024-08-30 | End: 2024-09-09

## 2024-08-30 NOTE — TELEPHONE ENCOUNTER
Will need to confirm patient is not having any severe abdominal pain. Bleeding likely related to hemorrhoids if is bright red blood with BM. I can send out hemorrhoid cream to see if this helps. Ok to may OV with NP. (If bleeding resolves with cream she may cancel).  If having large amount of blood per rectum, or severe abdominal pain. Recommend to present to ER.    Rx sent.  KN

## 2024-08-30 NOTE — TELEPHONE ENCOUNTER
Kayla Matta Staff  Caller: self (Today,  8:51 AM)  Type:  Sooner Apoointment Request    Caller is requesting a sooner appointment.  Caller declined first available appointment listed below.  Caller will not accept being placed on the waitlist and is requesting a message be sent to doctor.  Name of Caller:Lisbet Carranza  When is the first available appointment?09/2024  Symptoms:rectal bleeding  Would the patient rather a call back or a response via [facility]?   Best Call Back Number:[phone]  Additional Information: pt states she noticed this about a week or so, continueus      8/3024 10:07 AM    Returned pt call. Pt stated she is having rectal bleeding and this has be going on for a week. Pt is not sure if it is her hemorrhoids or diverticulitis. Pt mentioned that she eat some fresh peaches. Please advise. [facility]

## 2024-08-30 NOTE — TELEPHONE ENCOUNTER
Staff spoke to pt... she stated its bright red and it stops after she have BM.  She chose to make appt to see NP... 9-6-24 at 9:40am.. she stated if the bleeding stops completely then she will know its hemorrhoids and will cancel the appt...   joaquín

## 2024-09-03 NOTE — TELEPHONE ENCOUNTER
Spoke with the patient, she wishes to Cancel OV on Friday 09/06/2024. She stated that her symptoms have resolved.     Appt is canceled. -HERI,LPN

## 2024-09-03 NOTE — TELEPHONE ENCOUNTER
Cancellation  Received: Today   Pt Advice  Babita Wallace Staff  Caller: patient (Today,  8:41 AM)  Per phone call with patient, she would like to cancel the appointment on 09/06/2024 because she no longer has the symptoms.  Please return call at [phone] (home).    Thanks,  SJ

## 2024-09-27 ENCOUNTER — TELEPHONE (OUTPATIENT)
Dept: UROLOGY | Facility: CLINIC | Age: 79
End: 2024-09-27
Payer: MEDICARE

## 2024-09-27 DIAGNOSIS — N28.9 RENAL LESION: Primary | ICD-10-CM

## 2024-09-27 NOTE — TELEPHONE ENCOUNTER
Spoke with pt and informed of rsults. Yearly ultrasound ordered.    ----- Message from Shen Jaramillo MD sent at 9/27/2024  7:45 AM CDT -----  Please inform patient that it appears the cyst is unchanged on her kidney we will repeat a renal ultrasound in 1 year

## 2024-10-03 ENCOUNTER — OFFICE VISIT (OUTPATIENT)
Dept: UROLOGY | Facility: CLINIC | Age: 79
End: 2024-10-03
Payer: MEDICARE

## 2024-10-03 VITALS
HEART RATE: 56 BPM | WEIGHT: 185 LBS | BODY MASS INDEX: 34.04 KG/M2 | HEIGHT: 62 IN | SYSTOLIC BLOOD PRESSURE: 145 MMHG | DIASTOLIC BLOOD PRESSURE: 66 MMHG

## 2024-10-03 DIAGNOSIS — N28.9 RENAL LESION: Primary | ICD-10-CM

## 2024-10-03 PROCEDURE — 99214 OFFICE O/P EST MOD 30 MIN: CPT | Mod: S$GLB,,, | Performed by: UROLOGY

## 2024-10-03 NOTE — PROGRESS NOTES
Subjective:       Patient ID: Lisbet Carranza is a 79 y.o. female.    Chief Complaint: Follow-up (Follow up from ultra sound )      HPI: 79-year-old female patient following up for renal lesion.  Patient has CT on 02/27/2023 showing a small 1.3 cm lesion in the left kidney.  Patient recently had repeat CT that identify showed a 1.4 cm lesion unchanged from the initial CT.  As well as a 2 mm nonobstructing left renal calculus.    Follow-up  Pertinent negatives include no abdominal pain, arthralgias, chest pain, chills, congestion, coughing, diaphoresis, fatigue, fever, headaches, joint swelling, myalgias, nausea, neck pain, numbness, rash, sore throat, vomiting or weakness.     Past Medical History:   Past Medical History:   Diagnosis Date    Chronic back pain     Colon cancer 2005    s/p resection    High cholesterol     History of aortic stenosis     Hypertension     Left bundle branch block     Obesity, Class II, BMI 35-39.9, isolated (see actual BMI)     Personal history of colonic polyps        Past Surgical Historical:   Past Surgical History:   Procedure Laterality Date    AORTIC VALVULOPLASTY      bovine    APPENDECTOMY      CHOLECYSTECTOMY      CHOLECYSTOTOMY      RIGHT HEMICOLECTOMY  2005    TOTAL ABDOMINAL HYSTERECTOMY      TOTAL REPLACEMENT OF BOTH KNEES USING COMPUTER-ASSISTED NAVIGATION          Medications:   Medication List with Changes/Refills   Current Medications    AMLODIPINE (NORVASC) 5 MG TABLET    Take 5 mg by mouth.    ASPIRIN (ECOTRIN) 81 MG EC TABLET    Take 81 mg by mouth once daily.    CLOBETASOL (TEMOVATE) 0.05 % CREAM    APPLY ON THE SKIN TWICE A DAY    DIAZEPAM (VALIUM) 5 MG TABLET    TAKE 1 TABLET BY MOUTH ONCE DAILY AT NIGHT AS NEEDED    ESTRADIOL (ESTRACE) 0.01 % (0.1 MG/GRAM) VAGINAL CREAM    Place 1 g vaginally twice a week.    GABAPENTIN (NEURONTIN) 100 MG CAPSULE    Take 100 mg by mouth 3 (three) times daily.    HYDROCORTISONE 2.5 % CREAM    Apply topically 2 (two) times daily.  Apply per rectum for 10 days    LORATADINE (CLARITIN) 10 MG TABLET    Take 10 mg by mouth.    METOPROLOL TARTRATE (LOPRESSOR) 25 MG TABLET    TAKE 1/2 (ONE-HALF) TABLET BY MOUTH TWICE DAILY    METOPROLOL TARTRATE (LOPRESSOR) 50 MG TABLET    Take 50 mg by mouth 2 (two) times daily.    NITROGLYCERIN (NITROSTAT) 0.4 MG SL TABLET        SIMVASTATIN (ZOCOR) 40 MG TABLET    Take 40 mg by mouth every evening.        Past Social History:   Social History     Socioeconomic History    Marital status:     Number of children: 2   Occupational History    Occupation: Retired   Tobacco Use    Smoking status: Former     Current packs/day: 0.00     Types: Cigarettes     Quit date:      Years since quittin.7    Smokeless tobacco: Never   Substance and Sexual Activity    Alcohol use: Not Currently    Drug use: Not Currently    Sexual activity: Not Currently       Allergies:   Review of patient's allergies indicates:   Allergen Reactions    Vicodin [hydrocodone-acetaminophen] Hives        Family History:   Family History   Problem Relation Name Age of Onset    Lung cancer Father      Cervical cancer Sister      Colon cancer Neg Hx      Crohn's disease Neg Hx      Esophageal cancer Neg Hx      Irritable bowel syndrome Neg Hx      Liver cancer Neg Hx      Liver disease Neg Hx      Rectal cancer Neg Hx      Stomach cancer Neg Hx          Review of Systems:  Review of Systems   Constitutional:  Negative for activity change, appetite change, chills, diaphoresis, fatigue, fever and unexpected weight change.   HENT:  Negative for congestion, dental problem, drooling, ear discharge, ear pain, facial swelling, hearing loss, mouth sores, nosebleeds, postnasal drip, rhinorrhea, sinus pressure, sinus pain, sneezing, sore throat, tinnitus, trouble swallowing and voice change.    Eyes:  Negative for photophobia, pain, discharge, redness, itching and visual disturbance.   Respiratory:  Negative for apnea, cough, choking, chest  tightness, shortness of breath, wheezing and stridor.    Cardiovascular:  Negative for chest pain and leg swelling.   Gastrointestinal:  Negative for abdominal distention, abdominal pain, anal bleeding, blood in stool, constipation, diarrhea, nausea, rectal pain and vomiting.   Endocrine: Negative for cold intolerance, heat intolerance, polydipsia, polyphagia and polyuria.   Genitourinary: Negative.  Negative for decreased urine volume, difficulty urinating, dyspareunia, dysuria, enuresis, flank pain, frequency, genital sores, hematuria, menstrual problem, pelvic pain, urgency, vaginal bleeding, vaginal discharge and vaginal pain.   Musculoskeletal:  Negative for arthralgias, back pain, gait problem, joint swelling, myalgias, neck pain and neck stiffness.   Skin:  Negative for color change, pallor, rash and wound.   Allergic/Immunologic: Negative for environmental allergies, food allergies and immunocompromised state.   Neurological:  Negative for dizziness, tremors, seizures, syncope, facial asymmetry, speech difficulty, weakness, light-headedness, numbness and headaches.   Hematological:  Negative for adenopathy. Does not bruise/bleed easily.   Psychiatric/Behavioral:  Negative for agitation, behavioral problems, confusion, decreased concentration, dysphoric mood, hallucinations, self-injury, sleep disturbance and suicidal ideas. The patient is not nervous/anxious and is not hyperactive.        Physical Exam:  Physical Exam  Exam conducted with a chaperone present.   Constitutional:       Appearance: Normal appearance.   HENT:      Head: Normocephalic.      Nose: Nose normal.      Mouth/Throat:      Mouth: Mucous membranes are moist.      Pharynx: Oropharynx is clear.   Eyes:      Extraocular Movements: Extraocular movements intact.      Conjunctiva/sclera: Conjunctivae normal.      Pupils: Pupils are equal, round, and reactive to light.   Cardiovascular:      Rate and Rhythm: Normal rate and regular rhythm.       Pulses: Normal pulses.      Heart sounds: Normal heart sounds.   Pulmonary:      Effort: Pulmonary effort is normal.      Breath sounds: Normal breath sounds.   Abdominal:      General: Abdomen is flat. Bowel sounds are normal.      Palpations: Abdomen is soft.      Hernia: There is no hernia in the left inguinal area or right inguinal area.   Genitourinary:     General: Normal vulva.      Pubic Area: No rash or pubic lice.       Labia:         Right: No rash, tenderness, lesion or injury.         Left: No rash, tenderness, lesion or injury.       Urethra: No prolapse, urethral pain, urethral swelling or urethral lesion.      Rectum: Normal.   Musculoskeletal:         General: Normal range of motion.      Cervical back: Normal range of motion and neck supple.   Lymphadenopathy:      Lower Body: No right inguinal adenopathy. No left inguinal adenopathy.   Skin:     General: Skin is warm and dry.      Capillary Refill: Capillary refill takes less than 2 seconds.   Neurological:      General: No focal deficit present.      Mental Status: She is alert and oriented to person, place, and time. Mental status is at baseline.   Psychiatric:         Mood and Affect: Mood normal.         Behavior: Behavior normal.         Thought Content: Thought content normal.         Judgment: Judgment normal.         Assessment/Plan:     Renal lesion:  Reassured the patient this is a benign finding. Left renal cyst suspected. Recommend surveillance. Reviewed recent imaging.  We will repeat imaging in 1 year and have the patient follow up.  Problem List Items Addressed This Visit    None  Visit Diagnoses       Renal lesion    -  Primary    Relevant Orders    US Retroperitoneal Complete

## 2025-02-10 ENCOUNTER — TELEPHONE (OUTPATIENT)
Dept: GASTROENTEROLOGY | Facility: CLINIC | Age: 80
End: 2025-02-10
Payer: MEDICARE

## 2025-02-10 NOTE — TELEPHONE ENCOUNTER
----- Message from Jabariaddienadine sent at 2/10/2025  9:06 AM CST -----  Contact: ISAAC EVANS [[id number]]  ..Type:  Patient Requesting Call    Who Called:ISAAC EVANS [[id number]]  Does the patient know what this is regarding?:pt states she hasn't had a bowel movement in 5 days and needs to be seen immediately   Would the patient rather a call back or a response via [facility]? call  Best Call Back Number:.[phone] (home)     Additional Information:

## 2025-02-14 NOTE — PROGRESS NOTES
Clinic Note    Reason for visit:  The primary encounter diagnosis was Hemorrhoids, unspecified hemorrhoid type. Diagnoses of Constipation, unspecified constipation type, History of colon cancer, and History of colon polyps were also pertinent to this visit.    PCP: Robert Mcgowan       HPI:  This is a 80 y.o. female here for follow up. History of colon cancer stage I in 2005, s/p right hemicolectomy. She states she had a stomach virus a few weeks ago and had severe diarrhea. She was prescribed a medication for diarrhea by UC (unable to recall name of medication). She states her BM were normal for a while but then started to have very hard stools. Her hemorrhoids started to flare up and now is having rectal pain. Went over 5 days without BM. She only felt relief with enema. She stopped her fiber and MiraLax because of the rectal pain each time she had a BM. No blood in stool. Now is have very small BM daily. Having 1 good BM per week. No abdominal pain.    Colonoscopy 11/1/2023:  Healthy appearing ileocolonic anastomosis in right colon.  Sigmoid diverticulosis with some associated luminal narrowing. IH.  Small amount of fecal contamination, normal colonic mucosa otherwise.  Repeat colonoscopy in 3 years.     CT AP PO/IV 9/2023: No GB. Calcified granulomata of liver/spleen. Small HH. Multiple colonic diverticula. Stable 1.4 cm hyperdense lesion of the left kidney favored to be a hyperdense cyst.      CT abd w/ and w/o IV 2/2023: No GB, calcified granulomas of spleen, small 1.3 cm lesion in the left kidney measures 34 Hounsfield units precontrast in 50 Hounsfield units postcontrast which suggest mild enhancement.  This is suspicious for renal neoplasm however due to small size is also possible change in density secondary to volume averaging.  Following up with urology.      Had colonoscopy 5/9/2022 with Dr. Merritt, due to having rectal bleeding at that time, who reported multiple large diverticula in  descending/sigmoid colon and multiple medium-sized IH.    Review of Systems   Constitutional:  Negative for fatigue, fever and unexpected weight change.   HENT:  Negative for mouth sores, postnasal drip, sore throat and trouble swallowing.    Eyes:  Negative for pain, discharge and eye dryness.   Respiratory:  Negative for apnea, cough, choking, chest tightness, shortness of breath and wheezing.    Cardiovascular:  Negative for chest pain, palpitations and leg swelling.   Gastrointestinal:  Positive for constipation and rectal pain. Negative for abdominal distention, abdominal pain, anal bleeding, blood in stool, change in bowel habit, diarrhea, nausea, vomiting, reflux and fecal incontinence.   Genitourinary:  Negative for bladder incontinence, dysuria and hematuria.   Musculoskeletal:  Negative for arthralgias, back pain and joint swelling.   Integumentary:  Negative for color change and rash.   Allergic/Immunologic: Negative for environmental allergies and food allergies.   Neurological:  Negative for seizures and headaches.   Hematological:  Negative for adenopathy. Does not bruise/bleed easily.        Past Medical History:   Diagnosis Date    BMI 33.0-33.9,adult     Chronic back pain     Colon cancer 2005    s/p resection    High cholesterol     History of aortic stenosis     Hypertension     Left bundle branch block     Obesity, Class II, BMI 35-39.9, isolated (see actual BMI)     Personal history of colonic polyps      Past Surgical History:   Procedure Laterality Date    AORTIC VALVULOPLASTY      bovine    APPENDECTOMY      CHOLECYSTECTOMY      CHOLECYSTOTOMY      RIGHT HEMICOLECTOMY  2005    TOTAL ABDOMINAL HYSTERECTOMY      TOTAL REPLACEMENT OF BOTH KNEES USING COMPUTER-ASSISTED NAVIGATION       Family History   Problem Relation Name Age of Onset    Lung cancer Father      Cervical cancer Sister      Colon cancer Neg Hx      Crohn's disease Neg Hx      Esophageal cancer Neg Hx      Irritable bowel syndrome  "Neg Hx      Liver cancer Neg Hx      Liver disease Neg Hx      Rectal cancer Neg Hx      Stomach cancer Neg Hx      Celiac disease Neg Hx       Social History     Tobacco Use    Smoking status: Former     Current packs/day: 0.00     Types: Cigarettes     Quit date:      Years since quittin.1    Smokeless tobacco: Never   Substance Use Topics    Alcohol use: Not Currently    Drug use: Not Currently     Review of patient's allergies indicates:   Allergen Reactions    Vicodin [hydrocodone-acetaminophen] Hives      Medication List with Changes/Refills   Current Medications    AMLODIPINE (NORVASC) 5 MG TABLET    Take 5 mg by mouth.    ASPIRIN (ECOTRIN) 81 MG EC TABLET    Take 81 mg by mouth once daily.    CLOBETASOL (TEMOVATE) 0.05 % CREAM    APPLY ON THE SKIN TWICE A DAY    DIAZEPAM (VALIUM) 5 MG TABLET    TAKE 1 TABLET BY MOUTH ONCE DAILY AT NIGHT AS NEEDED    ESTRADIOL (ESTRACE) 0.01 % (0.1 MG/GRAM) VAGINAL CREAM    Place 1 g vaginally twice a week.    GABAPENTIN (NEURONTIN) 100 MG CAPSULE    Take 100 mg by mouth 3 (three) times daily.    LORATADINE (CLARITIN) 10 MG TABLET    Take 10 mg by mouth.    METOPROLOL TARTRATE (LOPRESSOR) 25 MG TABLET    TAKE 1/2 (ONE-HALF) TABLET BY MOUTH TWICE DAILY    METOPROLOL TARTRATE (LOPRESSOR) 50 MG TABLET    Take 50 mg by mouth 2 (two) times daily.    NITROGLYCERIN (NITROSTAT) 0.4 MG SL TABLET        SIMVASTATIN (ZOCOR) 40 MG TABLET    Take 40 mg by mouth every evening.   Changed and/or Refilled Medications    Modified Medication Previous Medication    HYDROCORTISONE 2.5 % CREAM hydrocortisone 2.5 % cream       Apply topically 2 (two) times daily. Apply per rectum for 10 days    Apply topically 2 (two) times daily. Apply per rectum for 10 days         Vital Signs:  BP (!) 141/57   Pulse (!) 51   Ht 5' 2" (1.575 m)   Wt 83 kg (183 lb)   SpO2 97%   BMI 33.47 kg/m²        Physical Exam  Vitals reviewed.   Constitutional:       General: She is awake. She is not in acute " distress.     Appearance: Normal appearance. She is well-developed. She is not ill-appearing, toxic-appearing or diaphoretic.   HENT:      Head: Normocephalic and atraumatic.      Nose: Nose normal.      Mouth/Throat:      Mouth: Mucous membranes are moist.      Pharynx: Oropharynx is clear. No oropharyngeal exudate or posterior oropharyngeal erythema.   Eyes:      General: Lids are normal. Gaze aligned appropriately. No scleral icterus.        Right eye: No discharge.         Left eye: No discharge.      Conjunctiva/sclera: Conjunctivae normal.   Neck:      Trachea: Trachea normal.   Cardiovascular:      Rate and Rhythm: Normal rate and regular rhythm.      Pulses:           Radial pulses are 2+ on the right side and 2+ on the left side.   Pulmonary:      Effort: Pulmonary effort is normal. No respiratory distress.      Breath sounds: No stridor. No wheezing.   Chest:      Chest wall: No tenderness.   Abdominal:      General: Bowel sounds are normal. There is no distension.      Palpations: Abdomen is soft. There is no fluid wave, hepatomegaly or mass.      Tenderness: There is no abdominal tenderness. There is no guarding or rebound.   Musculoskeletal:         General: No tenderness or deformity.      Cervical back: No tenderness.      Right lower leg: No edema.      Left lower leg: No edema.   Lymphadenopathy:      Cervical: No cervical adenopathy.   Skin:     General: Skin is warm and dry.      Capillary Refill: Capillary refill takes less than 2 seconds.      Coloration: Skin is not cyanotic, jaundiced or pale.   Neurological:      General: No focal deficit present.      Mental Status: She is alert and oriented to person, place, and time.      Motor: No tremor.   Psychiatric:         Attention and Perception: Attention normal.         Mood and Affect: Mood and affect normal.         Speech: Speech normal.         Behavior: Behavior normal. Behavior is cooperative.            All of the data above and below has  been reviewed by myself and any further interpretations will be reflected in the assessment and plan.   The data includes review of external notes, and independent interpretation of lab results, procedures, x-rays, and imaging reports.      Assessment:  Hemorrhoids, unspecified hemorrhoid type  -     hydrocortisone 2.5 % cream; Apply topically 2 (two) times daily. Apply per rectum for 10 days  Dispense: 30 g; Refill: 2    Constipation, unspecified constipation type    History of colon cancer    History of colon polyps      Constipation- discussed to start MiraLax with goal of soft complete BM. Contineu with SP.   Hemorrhoid start hydrocortisone cream 2.5% twice daily.    Colonoscopy due 2026. High risk     Recommendations:    Begin taking MiraLAX. Start with 1 capful daily, and titrate dose up or down until you are having daily, soft bowel movements.  Start hydrocortisone cream twice daily after BM.  Start Epsom salt baths.    If any tests, procedures, or imaging has been ordered and you are not contacted to schedule within 1-2 weeks, then you may call the central scheduling department directly at [phone].     Risks, benefits, and alternatives of medical management, any associated procedures, and/or treatment discussed with the patient. Patient given opportunity to ask questions and voices understanding. Patient has elected to proceed with the recommended care modalities as discussed.    Follow up Keep follow up as scheduled.    Order summary:  No orders of the defined types were placed in this encounter.         Instructed patient to notify my office if they have not been contacted within two weeks after any procedures, submitting any samples (biopsies, blood, stool, urine, etc.) or after any imaging (X-ray, CT, MRI, etc.).      Sharla Kingsley NP    This document may have been created using a voice recognition transcribing system. Incorrect words or phrases may have been missed during proofreading. Please  interpret accordingly or contact me for clarification.

## 2025-02-17 ENCOUNTER — OFFICE VISIT (OUTPATIENT)
Dept: GASTROENTEROLOGY | Facility: CLINIC | Age: 80
End: 2025-02-17
Payer: MEDICARE

## 2025-02-17 VITALS
SYSTOLIC BLOOD PRESSURE: 141 MMHG | WEIGHT: 183 LBS | HEART RATE: 51 BPM | OXYGEN SATURATION: 97 % | HEIGHT: 62 IN | BODY MASS INDEX: 33.68 KG/M2 | DIASTOLIC BLOOD PRESSURE: 57 MMHG

## 2025-02-17 DIAGNOSIS — K64.9 HEMORRHOIDS, UNSPECIFIED HEMORRHOID TYPE: Primary | ICD-10-CM

## 2025-02-17 DIAGNOSIS — Z86.0100 HISTORY OF COLON POLYPS: ICD-10-CM

## 2025-02-17 DIAGNOSIS — K59.00 CONSTIPATION, UNSPECIFIED CONSTIPATION TYPE: ICD-10-CM

## 2025-02-17 DIAGNOSIS — Z85.038 HISTORY OF COLON CANCER: ICD-10-CM

## 2025-02-17 RX ORDER — HYDROCORTISONE 25 MG/G
CREAM TOPICAL 2 TIMES DAILY
Qty: 30 G | Refills: 2 | Status: SHIPPED | OUTPATIENT
Start: 2025-02-17 | End: 2025-02-27

## 2025-02-17 NOTE — PATIENT INSTRUCTIONS
Begin taking MiraLAX. Start with 1 capful daily, and titrate dose up or down until you are having daily, soft bowel movements.  Start hydrocortisone cream twice daily after BM.  Start Epsom salt baths.    If any tests, procedures, or imaging has been ordered and you are not contacted to schedule within 1-2 weeks, then you may call the central scheduling department directly at [phone].   Please notify my office if you have not been contacted within two weeks after any procedures, submitting any samples (biopsies, blood, stool, urine, etc.) or after any imaging (X-ray, CT, MRI, etc.).

## 2025-04-01 ENCOUNTER — OFFICE VISIT (OUTPATIENT)
Dept: OBSTETRICS AND GYNECOLOGY | Facility: CLINIC | Age: 80
End: 2025-04-01
Payer: MEDICARE

## 2025-04-01 VITALS
WEIGHT: 181.5 LBS | HEIGHT: 62 IN | BODY MASS INDEX: 33.4 KG/M2 | DIASTOLIC BLOOD PRESSURE: 76 MMHG | HEART RATE: 60 BPM | SYSTOLIC BLOOD PRESSURE: 154 MMHG

## 2025-04-01 DIAGNOSIS — B37.31 YEAST VAGINITIS: ICD-10-CM

## 2025-04-01 DIAGNOSIS — M85.852 OSTEOPENIA OF BOTH HIPS: ICD-10-CM

## 2025-04-01 DIAGNOSIS — M85.851 OSTEOPENIA OF BOTH HIPS: ICD-10-CM

## 2025-04-01 DIAGNOSIS — Z12.31 SCREENING MAMMOGRAM, ENCOUNTER FOR: ICD-10-CM

## 2025-04-01 DIAGNOSIS — Z01.419 WELL WOMAN EXAM WITH ROUTINE GYNECOLOGICAL EXAM: Primary | ICD-10-CM

## 2025-04-01 DIAGNOSIS — N95.2 VAGINAL ATROPHY: ICD-10-CM

## 2025-04-01 PROCEDURE — G0101 CA SCREEN;PELVIC/BREAST EXAM: HCPCS | Mod: S$PBB,,, | Performed by: NURSE PRACTITIONER

## 2025-04-01 NOTE — PROGRESS NOTES
"    Subjective:       Patient ID: Lisbet Carranza is a 80 y.o. female.    Chief Complaint:  Well Woman      History of Present Illness   Presents for annual gyn exam. History and past labs reviewed with patient.    Complains of vaginal irritation and itching      OB History          3    Para   3    Term   3            AB        Living   3         SAB        IAB        Ectopic        Multiple        Live Births   3                  Review of Systems  Review of Systems   Constitutional:  Negative for chills and fever.   Respiratory:  Negative for shortness of breath.    Cardiovascular:  Negative for chest pain.   Gastrointestinal:  Negative for abdominal pain, blood in stool, constipation, diarrhea, nausea, vomiting and reflux.   Genitourinary:  Negative for dysmenorrhea, dyspareunia, dysuria, hematuria, hot flashes, menorrhagia, menstrual problem, pelvic pain, vaginal bleeding, vaginal discharge, postcoital bleeding and vaginal dryness.   Musculoskeletal:  Negative for arthralgias and joint swelling.   Integumentary:  Negative for rash, hair changes, breast mass, nipple discharge and breast skin changes.   Psychiatric/Behavioral:  Negative for depression. The patient is not nervous/anxious.    Breast: Negative for asymmetry, lump, mass, nipple discharge and skin changes          Objective:     Vitals:    25 1107   BP: (!) 154/76   Pulse: 60   Weight: 82.3 kg (181 lb 8 oz)   Height: 5' 2" (1.575 m)        Physical Exam:   Constitutional: She is oriented to person, place, and time. She appears well-developed and well-nourished.    HENT:   Head: Normocephalic and atraumatic.    Eyes: Pupils are equal, round, and reactive to light. Conjunctivae are normal.    Neck: No thyromegaly present.    Cardiovascular:  Normal rate, regular rhythm and normal heart sounds.            +click     Pulmonary/Chest: Effort normal and breath sounds normal. No respiratory distress.        Abdominal: Soft.   "   Genitourinary:    Inguinal canal, uterus, right adnexa, left adnexa and rectum normal.      Pelvic exam was performed with patient supine.   The external female genitalia was normal.   Genitalia hair distrobution normal .     Labial bartholins normal.Cervix is normal. There is vaginal discharge in the vagina. Uterus consistancy normal and Uerus contour normal  Uterus is not tender.           Musculoskeletal: Normal range of motion and moves all extremeties.       Neurological: She is alert and oriented to person, place, and time. She has normal reflexes.    Skin: Skin is warm and dry.    Psychiatric: She has a normal mood and affect. Her behavior is normal. Judgment and thought content normal.       breast exam wnl- no nipple dc, skin changes, masses or lymph nodes palpated bilaterally    Female chaperone present during exam       Slide examined under the microscope and there were per HPF  Clue cells -rare  Hyphae-many   Trichomonas-none   WBC-0-2      Assessment:     1. Vaginal atrophy    2. Screening mammogram, encounter for    3. Yeast vaginitis    4. Osteopenia of both hips              Plan:       Vaginal atrophy    Screening mammogram, encounter for  -     Mammo Digital Screening Bilat; Future; Expected date: 04/01/2025    Yeast vaginitis    Osteopenia of both hips       Monistat 7 x 7 days  Continue vaginal estrogen cream  Colonoscopy UTD  BMD UTD   Vaccine UTD   Risk assessment for inherited gyn cancer done   Women's daily Multi vitamin  Healthy diet, exercise, and life style   Patient was counseled today on A.C.S. Pap guidelines and recommendations for yearly pelvic exams, mammograms and monthly self breast exams; to see her PCP for other health maintenance.       Follow up in about 1 year (around 4/1/2026).

## 2025-04-30 ENCOUNTER — TELEPHONE (OUTPATIENT)
Dept: OBSTETRICS AND GYNECOLOGY | Facility: CLINIC | Age: 80
End: 2025-04-30
Payer: MEDICARE

## 2025-04-30 ENCOUNTER — RESULTS FOLLOW-UP (OUTPATIENT)
Dept: OBSTETRICS AND GYNECOLOGY | Facility: CLINIC | Age: 80
End: 2025-04-30

## 2025-04-30 NOTE — TELEPHONE ENCOUNTER
Spoke with the patient. Verified patient's name and . Patient notified of normal results and verbalized understanding, [place]        ----- Message from Bonita Ponce NP sent at 2025  2:23 PM CDT -----  Call with normal mmg   ----- Message -----  From: Dominique Edwards LPN  Sent: 2025   1:53 PM CDT  To: Bonita Ponce NP

## 2025-07-23 ENCOUNTER — OFFICE VISIT (OUTPATIENT)
Dept: GASTROENTEROLOGY | Facility: CLINIC | Age: 80
End: 2025-07-23
Payer: MEDICARE

## 2025-07-23 VITALS
HEIGHT: 62 IN | WEIGHT: 238.81 LBS | OXYGEN SATURATION: 95 % | DIASTOLIC BLOOD PRESSURE: 64 MMHG | SYSTOLIC BLOOD PRESSURE: 151 MMHG | HEART RATE: 62 BPM | BODY MASS INDEX: 43.94 KG/M2

## 2025-07-23 DIAGNOSIS — Z85.038 HISTORY OF COLON CANCER: ICD-10-CM

## 2025-07-23 DIAGNOSIS — Z86.0100 HISTORY OF COLON POLYPS: ICD-10-CM

## 2025-07-23 DIAGNOSIS — K59.00 CONSTIPATION, UNSPECIFIED CONSTIPATION TYPE: Primary | ICD-10-CM

## 2025-07-23 PROCEDURE — 99213 OFFICE O/P EST LOW 20 MIN: CPT | Mod: S$PBB,,, | Performed by: NURSE PRACTITIONER

## 2025-07-23 NOTE — PROGRESS NOTES
Clinic Note    Reason for visit:  The primary encounter diagnosis was Constipation, unspecified constipation type. Diagnoses of History of colon cancer and History of colon polyps were also pertinent to this visit.    PCP: Robert Mcgowan       HPI:  This is a 80 y.o. female here for follow up. History of colon cancer stage I in 2005, s/p right hemicolectomy, constipation, Hemorrhoid. She has started using SP and taking 3 fiber gummies which has been working well for her. She now has been having Bm daily. Take MiraLax as needed. No longer having problems with her hemorrhoids.    Colonoscopy 11/1/2023:  Healthy appearing ileocolonic anastomosis in right colon.  Sigmoid diverticulosis with some associated luminal narrowing. IH.  Small amount of fecal contamination, normal colonic mucosa otherwise.  Repeat colonoscopy in 3 years.     CT AP PO/IV 9/2023: No GB. Calcified granulomata of liver/spleen. Small HH. Multiple colonic diverticula. Stable 1.4 cm hyperdense lesion of the left kidney favored to be a hyperdense cyst.      CT abd w/ and w/o IV 2/2023: No GB, calcified granulomas of spleen, small 1.3 cm lesion in the left kidney measures 34 Hounsfield units precontrast in 50 Hounsfield units postcontrast which suggest mild enhancement.  This is suspicious for renal neoplasm however due to small size is also possible change in density secondary to volume averaging.  Following up with urology.      Had colonoscopy 5/9/2022 with Dr. Merritt, due to having rectal bleeding at that time, who reported multiple large diverticula in descending/sigmoid colon and multiple medium-sized IH.    Review of Systems   Constitutional:  Negative for fatigue, fever and unexpected weight change.   HENT:  Negative for mouth sores, postnasal drip, sore throat and trouble swallowing.    Eyes:  Negative for pain, discharge and eye dryness.   Respiratory:  Negative for apnea, cough, choking, chest tightness, shortness of breath and wheezing.     Cardiovascular:  Negative for chest pain, palpitations and leg swelling.   Gastrointestinal:  Negative for abdominal distention, abdominal pain, anal bleeding, blood in stool, change in bowel habit, constipation, diarrhea, nausea, rectal pain, vomiting, reflux and fecal incontinence.   Genitourinary:  Negative for bladder incontinence, dysuria and hematuria.   Musculoskeletal:  Negative for arthralgias, back pain and joint swelling.   Integumentary:  Negative for color change and rash.   Allergic/Immunologic: Negative for environmental allergies and food allergies.   Neurological:  Negative for seizures and headaches.   Hematological:  Negative for adenopathy. Does not bruise/bleed easily.        Past Medical History:   Diagnosis Date    BMI 33.0-33.9,adult     Chronic back pain     Colon cancer 2005    s/p resection    High cholesterol     History of aortic stenosis     Hypertension     Left bundle branch block     Obesity, Class II, BMI 35-39.9, isolated (see actual BMI)     Personal history of colonic polyps      Past Surgical History:   Procedure Laterality Date    AORTIC VALVULOPLASTY      bovine    APPENDECTOMY      CHOLECYSTECTOMY      CHOLECYSTOTOMY      RIGHT HEMICOLECTOMY  2005    TOTAL ABDOMINAL HYSTERECTOMY      TOTAL REPLACEMENT OF BOTH KNEES USING COMPUTER-ASSISTED NAVIGATION       Family History   Problem Relation Name Age of Onset    Lung cancer Father      Cervical cancer Sister      Colon cancer Neg Hx      Crohn's disease Neg Hx      Esophageal cancer Neg Hx      Irritable bowel syndrome Neg Hx      Liver cancer Neg Hx      Liver disease Neg Hx      Rectal cancer Neg Hx      Stomach cancer Neg Hx      Celiac disease Neg Hx       Social History[1]  Review of patient's allergies indicates:   Allergen Reactions    Vicodin [hydrocodone-acetaminophen] Hives      Medication List with Changes/Refills   Current Medications    AMLODIPINE (NORVASC) 5 MG TABLET    Take 5 mg by mouth.    ASPIRIN (ECOTRIN)  "81 MG EC TABLET    Take 81 mg by mouth once daily.    CLOBETASOL (TEMOVATE) 0.05 % CREAM    APPLY ON THE SKIN TWICE A DAY    DIAZEPAM (VALIUM) 5 MG TABLET    TAKE 1 TABLET BY MOUTH ONCE DAILY AT NIGHT AS NEEDED    ESTRADIOL (ESTRACE) 0.01 % (0.1 MG/GRAM) VAGINAL CREAM    Place 1 g vaginally twice a week.    GABAPENTIN (NEURONTIN) 100 MG CAPSULE    Take 100 mg by mouth 3 (three) times daily.    HYDROCORTISONE 2.5 % CREAM    Apply topically 2 (two) times daily. Apply per rectum for 10 days    LORATADINE (CLARITIN) 10 MG TABLET    Take 10 mg by mouth.    METOPROLOL TARTRATE (LOPRESSOR) 25 MG TABLET    TAKE 1/2 (ONE-HALF) TABLET BY MOUTH TWICE DAILY    METOPROLOL TARTRATE (LOPRESSOR) 50 MG TABLET    Take 50 mg by mouth 2 (two) times daily.    NITROGLYCERIN (NITROSTAT) 0.4 MG SL TABLET        SIMVASTATIN (ZOCOR) 40 MG TABLET    Take 40 mg by mouth every evening.         Vital Signs:  BP (!) 151/64 (BP Location: Left arm, Patient Position: Sitting)   Pulse 62   Ht 5' 2" (1.575 m)   Wt 108.3 kg (238 lb 12.8 oz)   SpO2 95%   BMI 43.68 kg/m²        Physical Exam  Vitals reviewed.   Constitutional:       General: She is awake. She is not in acute distress.     Appearance: Normal appearance. She is well-developed. She is not ill-appearing, toxic-appearing or diaphoretic.   HENT:      Head: Normocephalic and atraumatic.      Nose: Nose normal.      Mouth/Throat:      Mouth: Mucous membranes are moist.      Pharynx: Oropharynx is clear. No oropharyngeal exudate or posterior oropharyngeal erythema.   Eyes:      General: Lids are normal. Gaze aligned appropriately. No scleral icterus.        Right eye: No discharge.         Left eye: No discharge.      Conjunctiva/sclera: Conjunctivae normal.   Neck:      Trachea: Trachea normal.   Cardiovascular:      Rate and Rhythm: Normal rate and regular rhythm.      Pulses:           Radial pulses are 2+ on the right side and 2+ on the left side.   Pulmonary:      Effort: Pulmonary " effort is normal. No respiratory distress.      Breath sounds: No stridor. No wheezing.   Chest:      Chest wall: No tenderness.   Abdominal:      General: Bowel sounds are normal. There is no distension.      Palpations: Abdomen is soft. There is no fluid wave, hepatomegaly or mass.      Tenderness: There is no abdominal tenderness. There is no guarding or rebound.   Musculoskeletal:         General: No tenderness or deformity.      Cervical back: No tenderness.      Right lower leg: No edema.      Left lower leg: No edema.   Lymphadenopathy:      Cervical: No cervical adenopathy.   Skin:     General: Skin is warm and dry.      Capillary Refill: Capillary refill takes less than 2 seconds.      Coloration: Skin is not cyanotic, jaundiced or pale.   Neurological:      General: No focal deficit present.      Mental Status: She is alert and oriented to person, place, and time.      Motor: No tremor.   Psychiatric:         Attention and Perception: Attention normal.         Mood and Affect: Mood and affect normal.         Speech: Speech normal.         Behavior: Behavior normal. Behavior is cooperative.            All of the data above and below has been reviewed by myself and any further interpretations will be reflected in the assessment and plan.   The data includes review of external notes, and independent interpretation of lab results, procedures, x-rays, and imaging reports.      Assessment:  Constipation, unspecified constipation type    History of colon cancer    History of colon polyps      Constipation- Improvement with SP and 3 fiber gummies. MiraLax PRN.  Colonoscopy due 2026. High risk . Will schedule at follow up.    Recommendations:    Continue with fiber supplementation.    If any tests, procedures, or imaging has been ordered and you are not contacted to schedule within 1-2 weeks, then you may call the central scheduling department directly at [phone].     Risks, benefits, and alternatives of  medical management, any associated procedures, and/or treatment discussed with the patient. Patient given opportunity to ask questions and voices understanding. Patient has elected to proceed with the recommended care modalities as discussed.    Follow up in about 1 year (around 2026).    Order summary:  No orders of the defined types were placed in this encounter.         Instructed patient to notify my office if they have not been contacted within two weeks after any procedures, submitting any samples (biopsies, blood, stool, urine, etc.) or after any imaging (X-ray, CT, MRI, etc.).      Sharla Kingsley NP    This document may have been created using a voice recognition transcribing system. Incorrect words or phrases may have been missed during proofreading. Please interpret accordingly or contact me for clarification.          [1]   Social History  Tobacco Use    Smoking status: Former     Current packs/day: 0.00     Types: Cigarettes     Quit date:      Years since quittin.5    Smokeless tobacco: Never   Substance Use Topics    Alcohol use: Not Currently    Drug use: Not Currently     Types: Benzodiazepines

## 2025-07-23 NOTE — PATIENT INSTRUCTIONS
Continue with fiber supplementation.    If any tests, procedures, or imaging has been ordered and you are not contacted to schedule within 1-2 weeks, then you may call the central scheduling department directly at [phone].   Please notify my office if you have not been contacted within two weeks after any procedures, submitting any samples (biopsies, blood, stool, urine, etc.) or after any imaging (X-ray, CT, MRI, etc.).